# Patient Record
Sex: FEMALE | Race: WHITE | NOT HISPANIC OR LATINO | Employment: FULL TIME | URBAN - METROPOLITAN AREA
[De-identification: names, ages, dates, MRNs, and addresses within clinical notes are randomized per-mention and may not be internally consistent; named-entity substitution may affect disease eponyms.]

---

## 2017-01-18 DIAGNOSIS — Z12.31 ENCOUNTER FOR SCREENING MAMMOGRAM FOR MALIGNANT NEOPLASM OF BREAST: ICD-10-CM

## 2017-01-18 DIAGNOSIS — M54.9 DORSALGIA: ICD-10-CM

## 2017-01-19 ENCOUNTER — ALLSCRIPTS OFFICE VISIT (OUTPATIENT)
Dept: OTHER | Facility: OTHER | Age: 62
End: 2017-01-19

## 2017-01-23 ENCOUNTER — APPOINTMENT (OUTPATIENT)
Dept: PHYSICAL THERAPY | Facility: CLINIC | Age: 62
End: 2017-01-23
Payer: COMMERCIAL

## 2017-01-23 DIAGNOSIS — M54.9 DORSALGIA: ICD-10-CM

## 2017-01-23 PROCEDURE — 97162 PT EVAL MOD COMPLEX 30 MIN: CPT

## 2017-01-23 PROCEDURE — 97140 MANUAL THERAPY 1/> REGIONS: CPT

## 2017-01-24 ENCOUNTER — GENERIC CONVERSION - ENCOUNTER (OUTPATIENT)
Dept: OTHER | Facility: OTHER | Age: 62
End: 2017-01-24

## 2017-01-26 ENCOUNTER — APPOINTMENT (OUTPATIENT)
Dept: PHYSICAL THERAPY | Facility: CLINIC | Age: 62
End: 2017-01-26
Payer: COMMERCIAL

## 2017-01-26 PROCEDURE — 97110 THERAPEUTIC EXERCISES: CPT

## 2017-01-26 PROCEDURE — 97140 MANUAL THERAPY 1/> REGIONS: CPT

## 2017-01-26 PROCEDURE — 97010 HOT OR COLD PACKS THERAPY: CPT

## 2017-01-31 ENCOUNTER — APPOINTMENT (OUTPATIENT)
Dept: PHYSICAL THERAPY | Facility: CLINIC | Age: 62
End: 2017-01-31
Payer: COMMERCIAL

## 2017-01-31 PROCEDURE — 97140 MANUAL THERAPY 1/> REGIONS: CPT

## 2017-01-31 PROCEDURE — 97110 THERAPEUTIC EXERCISES: CPT

## 2017-01-31 PROCEDURE — G0283 ELEC STIM OTHER THAN WOUND: HCPCS

## 2017-01-31 PROCEDURE — 97014 ELECTRIC STIMULATION THERAPY: CPT

## 2017-02-02 ENCOUNTER — APPOINTMENT (OUTPATIENT)
Dept: PHYSICAL THERAPY | Facility: CLINIC | Age: 62
End: 2017-02-02
Payer: COMMERCIAL

## 2017-02-07 ENCOUNTER — APPOINTMENT (OUTPATIENT)
Dept: PHYSICAL THERAPY | Facility: CLINIC | Age: 62
End: 2017-02-07
Payer: COMMERCIAL

## 2017-02-07 PROCEDURE — 97140 MANUAL THERAPY 1/> REGIONS: CPT

## 2017-02-07 PROCEDURE — 97110 THERAPEUTIC EXERCISES: CPT

## 2017-02-07 PROCEDURE — 97014 ELECTRIC STIMULATION THERAPY: CPT

## 2017-02-07 PROCEDURE — G0283 ELEC STIM OTHER THAN WOUND: HCPCS

## 2017-02-09 ENCOUNTER — APPOINTMENT (OUTPATIENT)
Dept: PHYSICAL THERAPY | Facility: CLINIC | Age: 62
End: 2017-02-09
Payer: COMMERCIAL

## 2017-02-09 PROCEDURE — 97140 MANUAL THERAPY 1/> REGIONS: CPT

## 2017-02-09 PROCEDURE — 97110 THERAPEUTIC EXERCISES: CPT

## 2017-02-09 PROCEDURE — 97010 HOT OR COLD PACKS THERAPY: CPT | Performed by: PHYSICAL THERAPIST

## 2017-02-16 ENCOUNTER — APPOINTMENT (OUTPATIENT)
Dept: PHYSICAL THERAPY | Facility: CLINIC | Age: 62
End: 2017-02-16
Payer: COMMERCIAL

## 2017-02-16 PROCEDURE — 97140 MANUAL THERAPY 1/> REGIONS: CPT

## 2017-02-16 PROCEDURE — 97110 THERAPEUTIC EXERCISES: CPT

## 2017-02-21 ENCOUNTER — APPOINTMENT (OUTPATIENT)
Dept: PHYSICAL THERAPY | Facility: CLINIC | Age: 62
End: 2017-02-21
Payer: COMMERCIAL

## 2017-02-21 PROCEDURE — 97110 THERAPEUTIC EXERCISES: CPT

## 2017-02-21 PROCEDURE — 97140 MANUAL THERAPY 1/> REGIONS: CPT

## 2017-04-17 ENCOUNTER — TRANSCRIBE ORDERS (OUTPATIENT)
Dept: ADMINISTRATIVE | Facility: HOSPITAL | Age: 62
End: 2017-04-17

## 2017-04-17 ENCOUNTER — HOSPITAL ENCOUNTER (OUTPATIENT)
Dept: MAMMOGRAPHY | Facility: MEDICAL CENTER | Age: 62
Discharge: HOME | End: 2017-04-17
Payer: COMMERCIAL

## 2017-04-17 DIAGNOSIS — Z12.31 VISIT FOR SCREENING MAMMOGRAM: Primary | ICD-10-CM

## 2017-04-17 DIAGNOSIS — Z12.31 ENCOUNTER FOR SCREENING MAMMOGRAM FOR MALIGNANT NEOPLASM OF BREAST: ICD-10-CM

## 2017-04-17 PROCEDURE — G0202 SCR MAMMO BI INCL CAD: HCPCS

## 2017-05-16 ENCOUNTER — ALLSCRIPTS OFFICE VISIT (OUTPATIENT)
Dept: OTHER | Facility: OTHER | Age: 62
End: 2017-05-16

## 2017-06-05 ENCOUNTER — ANESTHESIA EVENT (OUTPATIENT)
Dept: GASTROENTEROLOGY | Facility: HOSPITAL | Age: 62
End: 2017-06-05
Payer: COMMERCIAL

## 2017-06-05 RX ORDER — FEXOFENADINE HCL 180 MG/1
180 TABLET ORAL DAILY
COMMUNITY

## 2017-06-05 RX ORDER — ESOMEPRAZOLE MAGNESIUM 10 MG/1
10 GRANULE, FOR SUSPENSION, EXTENDED RELEASE ORAL
COMMUNITY
End: 2020-07-09

## 2017-06-05 RX ORDER — GABAPENTIN 300 MG/1
300 CAPSULE ORAL 3 TIMES DAILY
Status: ON HOLD | COMMUNITY
End: 2017-06-06 | Stop reason: ALTCHOICE

## 2017-06-06 ENCOUNTER — HOSPITAL ENCOUNTER (OUTPATIENT)
Facility: HOSPITAL | Age: 62
Setting detail: OUTPATIENT SURGERY
Discharge: HOME | End: 2017-06-06
Attending: SURGERY | Admitting: SURGERY
Payer: COMMERCIAL

## 2017-06-06 ENCOUNTER — GENERIC CONVERSION - ENCOUNTER (OUTPATIENT)
Dept: PERIOP | Facility: HOSPITAL | Age: 62
End: 2017-06-06

## 2017-06-06 ENCOUNTER — ANESTHESIA (OUTPATIENT)
Dept: GASTROENTEROLOGY | Facility: HOSPITAL | Age: 62
End: 2017-06-06
Payer: COMMERCIAL

## 2017-06-06 VITALS
BODY MASS INDEX: 33.13 KG/M2 | WEIGHT: 180 LBS | RESPIRATION RATE: 20 BRPM | HEIGHT: 62 IN | OXYGEN SATURATION: 93 % | HEART RATE: 82 BPM | SYSTOLIC BLOOD PRESSURE: 120 MMHG | DIASTOLIC BLOOD PRESSURE: 75 MMHG | TEMPERATURE: 98.1 F

## 2017-06-06 DIAGNOSIS — Z12.11 ENCOUNTER FOR SCREENING FOR MALIGNANT NEOPLASM OF COLON: ICD-10-CM

## 2017-06-06 PROCEDURE — 88342 IMHCHEM/IMCYTCHM 1ST ANTB: CPT | Performed by: SURGERY

## 2017-06-06 PROCEDURE — 88313 SPECIAL STAINS GROUP 2: CPT | Performed by: SURGERY

## 2017-06-06 PROCEDURE — 88305 TISSUE EXAM BY PATHOLOGIST: CPT | Performed by: SURGERY

## 2017-06-06 RX ORDER — PROPOFOL 10 MG/ML
INJECTION, EMULSION INTRAVENOUS AS NEEDED
Status: DISCONTINUED | OUTPATIENT
Start: 2017-06-06 | End: 2017-06-06 | Stop reason: SURG

## 2017-06-06 RX ORDER — SODIUM CHLORIDE 9 MG/ML
125 INJECTION, SOLUTION INTRAVENOUS CONTINUOUS
Status: DISCONTINUED | OUTPATIENT
Start: 2017-06-06 | End: 2017-06-06 | Stop reason: HOSPADM

## 2017-06-06 RX ORDER — LEVALBUTEROL INHALATION SOLUTION 0.31 MG/3ML
1 SOLUTION RESPIRATORY (INHALATION) EVERY 4 HOURS PRN
COMMUNITY

## 2017-06-06 RX ADMIN — PROPOFOL 50 MG: 10 INJECTION, EMULSION INTRAVENOUS at 08:25

## 2017-06-06 RX ADMIN — PROPOFOL 50 MG: 10 INJECTION, EMULSION INTRAVENOUS at 08:06

## 2017-06-06 RX ADMIN — PROPOFOL 40 MG: 10 INJECTION, EMULSION INTRAVENOUS at 08:15

## 2017-06-06 RX ADMIN — PROPOFOL 70 MG: 10 INJECTION, EMULSION INTRAVENOUS at 08:14

## 2017-06-06 RX ADMIN — PROPOFOL 40 MG: 10 INJECTION, EMULSION INTRAVENOUS at 08:16

## 2017-06-06 RX ADMIN — PROPOFOL 200 MG: 10 INJECTION, EMULSION INTRAVENOUS at 08:01

## 2017-06-06 RX ADMIN — SODIUM CHLORIDE 125 ML/HR: 0.9 INJECTION, SOLUTION INTRAVENOUS at 07:21

## 2017-06-09 ENCOUNTER — GENERIC CONVERSION - ENCOUNTER (OUTPATIENT)
Dept: OTHER | Facility: OTHER | Age: 62
End: 2017-06-09

## 2017-06-12 ENCOUNTER — GENERIC CONVERSION - ENCOUNTER (OUTPATIENT)
Dept: OTHER | Facility: OTHER | Age: 62
End: 2017-06-12

## 2018-01-12 NOTE — MISCELLANEOUS
Message   Recorded as Task   Date: 06/07/2017 09:13 AM, Created By: Yumi Muller   Task Name: Follow Up   Assigned To: [facility],Team   Regarding Patient: Olga Valdez, Status: Active   Musa Roam - 07 Jun 2017 9:13 AM     TASK CREATED  Routine post colonoscopy / EGD call placed to patient  She had these procedures on 06/06/2017  Left message on patient's phone number to please give our office a call back  Pathology is pending  Active Problems    1  Back pain (724 5) (M54 9)   2  Colon cancer screening (V76 51) (Z12 11)   3  Encounter for routine gynecological examination with Papanicolaou smear of cervix   (V72 31,V76 2) (Z01 419)   4  Encounter for screening mammogram for malignant neoplasm of breast (V76 12)   (Z12 31)   5  Laboratory examination ordered as part of a routine general medical examination   (V72 62) (Z00 00)   6  Screening for osteoporosis (V82 81) (Z13 820)   7  Symptomatic menopausal or female climacteric states (627 2) (N95 1)    Current Meds   1  Advair Diskus 250-50 MCG/DOSE Inhalation Aerosol Powder Breath Activated; Therapy: (Recorded:93Kas0824) to Recorded   2  Allegra CAPS; Therapy: (Recorded:77Ass3979) to Recorded   3  MoviPrep 100 GM Oral Solution Reconstituted; USE AS DIRECTED; Therapy: 34ZEI3180 to (Last Rx:04Lxj0661) Ordered   4  NexIUM 10 MG Oral Packet; Therapy: (Recorded:13Ple2951) to Recorded   5  Prempro 0 3-1 5 MG Oral Tablet; Therapy: (Recorded:12Vjy4090) to Recorded    Allergies    1   Sulfa Drugs    Signatures   Electronically signed by : Mily England, ; Jun 9 2017  9:45AM EST                       (Author)

## 2018-01-13 VITALS
HEIGHT: 62 IN | DIASTOLIC BLOOD PRESSURE: 80 MMHG | BODY MASS INDEX: 33.96 KG/M2 | SYSTOLIC BLOOD PRESSURE: 132 MMHG | WEIGHT: 184.56 LBS

## 2018-01-14 VITALS
SYSTOLIC BLOOD PRESSURE: 110 MMHG | HEIGHT: 62 IN | TEMPERATURE: 98.8 F | HEART RATE: 96 BPM | BODY MASS INDEX: 32.96 KG/M2 | DIASTOLIC BLOOD PRESSURE: 76 MMHG | WEIGHT: 179.13 LBS

## 2018-01-15 NOTE — MISCELLANEOUS
Message  Mailed colono & EGD letter to patients home address  Tasked referring - Dr Lianne Pires  Faxed OP notes, path and office visit to Dr Myesha Kaur office  {Updated pre-visit planning to reflect recommended 3 year follow up  }      Active Problems    1  Back pain (724 5) (M54 9)   2  Colon cancer screening (V76 51) (Z12 11)   3  Encounter for routine gynecological examination with Papanicolaou smear of cervix   (V72 31,V76 2) (Z01 419)   4  Encounter for screening mammogram for malignant neoplasm of breast (V76 12)   (Z12 31)   5  Laboratory examination ordered as part of a routine general medical examination   (V72 62) (Z00 00)   6  Screening for osteoporosis (V82 81) (Z13 820)   7  Symptomatic menopausal or female climacteric states (627 2) (N95 1)    Current Meds   1  Advair Diskus 250-50 MCG/DOSE Inhalation Aerosol Powder Breath Activated; Therapy: (Recorded:41Afu2427) to Recorded   2  Allegra CAPS; Therapy: (Recorded:62Ekn8079) to Recorded   3  MoviPrep 100 GM Oral Solution Reconstituted; USE AS DIRECTED; Therapy: 62PMT9401 to (Last Rx:54Ecx7816) Ordered   4  NexIUM 10 MG Oral Packet; Therapy: (Recorded:53Tif2430) to Recorded   5  Prempro 0 3-1 5 MG Oral Tablet; Therapy: (Recorded:83Ikh5255) to Recorded    Allergies    1  Sulfa Drugs    Plan  Colon cancer screening    · COLONOSCOPY (GI, SURG); Status:Complete - Retrospective By Protocol  Authorization;   Done: 55NTR0092 10:14AM   · COLONOSCOPY (GI, SURG) ; every 3 years;  Last 63OQU8313; Next 92GPI1476;  Status:Active    Signatures   Electronically signed by : Vidhya Dewitt, ; Jun 21 2017 10:32AM EST                       (Author)

## 2018-02-15 ENCOUNTER — OFFICE VISIT (OUTPATIENT)
Dept: OBGYN CLINIC | Facility: MEDICAL CENTER | Age: 63
End: 2018-02-15
Payer: COMMERCIAL

## 2018-02-15 VITALS — DIASTOLIC BLOOD PRESSURE: 70 MMHG | BODY MASS INDEX: 33.29 KG/M2 | WEIGHT: 182 LBS | SYSTOLIC BLOOD PRESSURE: 122 MMHG

## 2018-02-15 DIAGNOSIS — Z01.419 GYNECOLOGIC EXAM NORMAL: Primary | ICD-10-CM

## 2018-02-15 DIAGNOSIS — Z12.39 SCREENING FOR MALIGNANT NEOPLASM OF BREAST: ICD-10-CM

## 2018-02-15 DIAGNOSIS — N95.1 SYMPTOMS, SUCH AS FLUSHING, SLEEPLESSNESS, HEADACHE, LACK OF CONCENTRATION, ASSOCIATED WITH THE MENOPAUSE: ICD-10-CM

## 2018-02-15 DIAGNOSIS — Z12.4 ENCOUNTER FOR PAPANICOLAOU SMEAR FOR CERVICAL CANCER SCREENING: ICD-10-CM

## 2018-02-15 PROCEDURE — S0612 ANNUAL GYNECOLOGICAL EXAMINA: HCPCS | Performed by: OBSTETRICS & GYNECOLOGY

## 2018-02-15 PROCEDURE — G0145 SCR C/V CYTO,THINLAYER,RESCR: HCPCS | Performed by: OBSTETRICS & GYNECOLOGY

## 2018-02-15 NOTE — PROGRESS NOTES
ASSESSMENT & PLAN: Yeison Adamson is a 63 y o  [place] with normal gynecologic exam     1   Routine well woman exam done today  2  Pap and HPV:  The patient's Pap and cotesting was done today  Current ASCCP Guidelines reviewed  Per patient's request  3  Mammogram ordered - due 2018  4  Colonoscopy last done 17  4  The following were reviewed in today's visit: breast self exam  5  Restarted PremPro - would like refill  CC:  Annual Gynecologic Examination    HPI: Yeison Adamson is a 63 y o  [place] who presents for annual gynecologic examination  She has the following concerns:  None  Tried Gabapentin last year but did not tolerate side effects    Health Maintenance:    She wears her seatbelt routinely  She does perform regular monthly self breast exams  She feels safe at home  Pap:   Last mammogram:   Last colonoscopy:     Past Medical History:   Diagnosis Date    Asthma     Chronic pain disorder     back    GERD (gastroesophageal reflux disease)     Hearing loss of right ear     Obesity     Sleep apnea     wears cpap       Past Surgical History:   Procedure Laterality Date    BLADDER SURGERY      sling     SECTION      EGD AND COLONOSCOPY N/A 2017    Procedure: EGD with bx AND COLONOSCOPY polypectomy x3;  Surgeon: Mildred Canales MD;  Location: [facility]; Service:        Past OB/Gyn History:  OB History      Para Term  AB Living    5 2            SAB TAB Ectopic Multiple Live Births                         History of sexually transmitted infection No  History of abnormal pap smears  No     History reviewed  No pertinent family history  Social History:  Social History     Social History    Marital status: /Civil Union     Spouse name: N/A    Number of children: N/A    Years of education: N/A     Occupational History    Not on file       Social History Main Topics    Smoking status: Never Smoker    Smokeless tobacco: Never Used   [place] Alcohol use No    Drug use: No    Sexual activity: No     Other Topics Concern    Not on file     Social History Narrative    No narrative on file       Allergies   Allergen Reactions    Eggs Or Egg-Derived Products GI Intolerance    Fruit C [Ascorbate]      strawberries  strawberries    Omeprazole      Other reaction(s): nausea    Acetazolamide Rash    Sulfa Antibiotics Rash       Current Outpatient Prescriptions:     esomeprazole (NexIUM) 10 MG packet, Take 10 mg by mouth every morning before breakfast, Disp: , Rfl:     estrogen, conjugated,-medroxyprogesterone (PREMPRO) 0 3-1 5 MG per tablet, Take 1 tablet by mouth daily, Disp: , Rfl:     fexofenadine (ALLEGRA) 180 MG tablet, Take 180 mg by mouth daily, Disp: , Rfl:     fluticasone-salmeterol (ADVAIR) 100-50 mcg/dose, Inhale 1 puff every 12 (twelve) hours, Disp: , Rfl:     levalbuterol (XOPENEX) 0 31 mg/3 mL nebulizer solution, Take 1 ampule by nebulization every 4 (four) hours as needed for wheezing, Disp: , Rfl:       Review of Systems  Constitutional :no fever, feels well, no tiredness, no recent weight gain or loss  ENT: no ear ache, no loss of hearing, no nosebleeds or nasal discharge, no sore throat or hoarseness  Cardiovascular: no complaints of slow or fast heart beat, no chest pain, no palpitations, no leg claudication or lower extremity edema  Respiratory: no complaints of shortness of shortness of breath, no REGALADO  Breasts:no complaints of breast pain, breast lump, or nipple discharge  Gastrointestinal: no complaints of abdominal pain, constipation,nausea, vomiting, or diarrhea or bloody stools  Genitourinary : no complaints of dysuria, incontinence, pelvic pain, no dysmenorrhea, vaginal discharge or abnormal vaginal bleeding and as noted in HPI    Integumentary: no complaints of skin rash or lesion, itching or dry skin  Neurological: no complaints of headache, no confusion, no numbness or tingling, no dizziness or fainting    Objective /70   Wt 82 6 kg (182 lb)   BMI 33 29 kg/m²     General:   appears stated age, cooperative, alert normal mood and affect   Neck: Neck: normal, supple,trachea midline, no masses   Heart: regular rate and rhythm, S1, S2 normal, no murmur, click, rub or gallop   Lungs: clear to auscultation bilaterally   Breasts: normal appearance, no masses or tenderness, No nipple retraction or dimpling, No nipple discharge or bleeding   Abdomen: soft, non-tender, without masses or organomegaly   Vulva: normal female genitalia, Bartholin's, Urethra, [place] normal, no lesions   Vagina: normal vagina, no discharge, exudate, lesion, or erythema   Urethra: normal   Cervix: Normal, no discharge  PAP done     Uterus: normal size, contour, position, consistency, mobility, non-tender   Adnexa: normal adnexa

## 2018-02-19 LAB
LAB AP GYN PRIMARY INTERPRETATION: NORMAL
Lab: NORMAL

## 2018-04-18 ENCOUNTER — HOSPITAL ENCOUNTER (OUTPATIENT)
Dept: MAMMOGRAPHY | Facility: MEDICAL CENTER | Age: 63
Discharge: HOME | End: 2018-04-18
Payer: COMMERCIAL

## 2018-04-18 DIAGNOSIS — Z12.39 SCREENING FOR MALIGNANT NEOPLASM OF BREAST: ICD-10-CM

## 2018-04-18 PROCEDURE — 77067 SCR MAMMO BI INCL CAD: CPT

## 2018-04-18 PROCEDURE — 77063 BREAST TOMOSYNTHESIS BI: CPT

## 2020-01-15 ENCOUNTER — TELEPHONE (OUTPATIENT)
Dept: OBGYN CLINIC | Facility: MEDICAL CENTER | Age: 65
End: 2020-01-15

## 2020-01-15 DIAGNOSIS — Z12.39 SCREENING FOR BREAST CANCER: Primary | ICD-10-CM

## 2020-02-26 ENCOUNTER — ANNUAL EXAM (OUTPATIENT)
Dept: OBGYN CLINIC | Facility: MEDICAL CENTER | Age: 65
End: 2020-02-26
Payer: COMMERCIAL

## 2020-02-26 VITALS — DIASTOLIC BLOOD PRESSURE: 70 MMHG | SYSTOLIC BLOOD PRESSURE: 126 MMHG | WEIGHT: 185.4 LBS | BODY MASS INDEX: 33.91 KG/M2

## 2020-02-26 DIAGNOSIS — N95.1 VASOMOTOR SYMPTOMS DUE TO MENOPAUSE: ICD-10-CM

## 2020-02-26 DIAGNOSIS — Z01.419 ENCOUNTER FOR GYNECOLOGICAL EXAMINATION WITH PAPANICOLAOU SMEAR OF CERVIX: ICD-10-CM

## 2020-02-26 DIAGNOSIS — Z01.419 ENCOUNTER FOR WELL WOMAN EXAM WITH ROUTINE GYNECOLOGICAL EXAM: Primary | ICD-10-CM

## 2020-02-26 PROCEDURE — G0145 SCR C/V CYTO,THINLAYER,RESCR: HCPCS | Performed by: OBSTETRICS & GYNECOLOGY

## 2020-02-26 PROCEDURE — S0612 ANNUAL GYNECOLOGICAL EXAMINA: HCPCS | Performed by: OBSTETRICS & GYNECOLOGY

## 2020-02-26 RX ORDER — PAROXETINE 7.5 MG/1
7.5 CAPSULE ORAL DAILY
Qty: 30 CAPSULE | Refills: 3 | Status: SHIPPED | OUTPATIENT
Start: 2020-02-26 | End: 2020-08-14

## 2020-02-26 NOTE — PROGRESS NOTES
ASSESSMENT & PLAN: Eduarda Mann is a 60 y o   with normal gynecologic exam     1   Routine well woman exam done today  2  Pap and HPV:  The patient's Pap with reflex cotesting was done today  Current ASCCP Guidelines reviewed  Per patient's request  3  Mammogram ordered - done 2018  4  Colonoscopy last done 17  4  The following were reviewed in today's visit: breast self exam  5  Vasomotor symptoms:  Stopped prempro 6 months ago, willing to try [place]    CC:  Annual Gynecologic Examination    HPI: Eduarda Mann is a 60 y o  [place] who presents for annual gynecologic examination  She has the following concerns:  None  Tried Gabapentin last year but did not tolerate side effects    Health Maintenance:    She wears her seatbelt routinely  She does perform regular monthly self breast exams  She feels safe at home  Pap:   Last mammogram:   Last colonoscopy:     Past Medical History:   Diagnosis Date    Asthma     Chronic pain disorder     back    GERD (gastroesophageal reflux disease)     Hearing loss of right ear     Obesity     Sleep apnea     wears cpap       Past Surgical History:   Procedure Laterality Date    BLADDER SURGERY      sling     SECTION      EGD AND COLONOSCOPY N/A 2017    Procedure: EGD with bx AND COLONOSCOPY polypectomy x3;  Surgeon: Meredith Salazar MD;  Location: [facility]; Service:        Past OB/Gyn History:  OB History        5    Para   3    Term   2       1    AB   2    Living   2       SAB   2    TAB        Ectopic        Multiple        Live Births   2                   History of sexually transmitted infection No  History of abnormal pap smears  No     History reviewed  No pertinent family history      Social History:  Social History     Socioeconomic History    Marital status: /Civil Union     Spouse name: Not on file    Number of children: Not on file    Years of education: Not on file    Highest education level: Not on file   Occupational History    Not on file   Social Needs    Financial resource strain: Not on file    Food insecurity:     Worry: Not on file     Inability: Not on file    Transportation needs:     Medical: Not on file     Non-medical: Not on file   Tobacco Use    Smoking status: Never Smoker    Smokeless tobacco: Never Used   Substance and Sexual Activity    Alcohol use: No    Drug use: No    Sexual activity: Not Currently     Birth control/protection: Post-menopausal   Lifestyle    Physical activity:     Days per week: Not on file     Minutes per session: Not on file    Stress: Not on file   Relationships    Social connections:     Talks on phone: Not on file     Gets together: Not on file     Attends Buddhism service: Not on file     Active member of club or organization: Not on file     Attends meetings of clubs or organizations: Not on file     Relationship status: Not on file    Intimate partner violence:     Fear of current or ex partner: Not on file     Emotionally abused: Not on file     Physically abused: Not on file     Forced sexual activity: Not on file   Other Topics Concern    Not on file   Social History Narrative    Not on file       Allergies   Allergen Reactions    Eggs Or Egg-Derived Products GI Intolerance    Fruit C [Ascorbate]      strawberries  strawberries    Omeprazole      Other reaction(s): nausea    Acetazolamide Rash    Sulfa Antibiotics Rash       Current Outpatient Medications:     esomeprazole (NexIUM) 10 MG packet, Take 10 mg by mouth every morning before breakfast, Disp: , Rfl:     fexofenadine (ALLEGRA) 180 MG tablet, Take 180 mg by mouth daily, Disp: , Rfl:     fluticasone-salmeterol (ADVAIR) 100-50 mcg/dose, Inhale 1 puff every 12 (twelve) hours, Disp: , Rfl:     levalbuterol (XOPENEX) 0 31 mg/3 mL nebulizer solution, Take 1 ampule by nebulization every 4 (four) hours as needed for wheezing, Disp: , Rfl:     estrogen, conjugated,-medroxyprogesterone (PREMPRO) 0 3-1 5 MG per tablet, Take 1 tablet by mouth daily (Patient not taking: Reported on 2/26/2020), Disp: 90 tablet, Rfl: 3      Review of Systems  Constitutional :no fever, feels well, no tiredness, no recent weight gain or loss  ENT: no ear ache, no loss of hearing, no nosebleeds or nasal discharge, no sore throat or hoarseness  Cardiovascular: no complaints of slow or fast heart beat, no chest pain, no palpitations, no leg claudication or lower extremity edema  Respiratory: no complaints of shortness of shortness of breath, no REGALADO  Breasts:no complaints of breast pain, breast lump, or nipple discharge  Gastrointestinal: no complaints of abdominal pain, constipation,nausea, vomiting, or diarrhea or bloody stools  Genitourinary : no complaints of dysuria, incontinence, pelvic pain, no dysmenorrhea, vaginal discharge or abnormal vaginal bleeding and as noted in HPI  Integumentary: no complaints of skin rash or lesion, itching or dry skin  Neurological: no complaints of headache, no confusion, no numbness or tingling, no dizziness or fainting    Objective      /70   Wt 84 1 kg (185 lb 6 4 oz)   BMI 33 91 kg/m²     General:   appears stated age, cooperative, alert normal mood and affect   Neck: Neck: normal, supple,trachea midline, no masses   Heart: regular rate and rhythm, S1, S2 normal, no murmur, click, rub or gallop   Lungs: clear to auscultation bilaterally   Breasts: normal appearance, no masses or tenderness, No nipple retraction or dimpling, No nipple discharge or bleeding   Abdomen: soft, non-tender, without masses or organomegaly   Vulva: normal female genitalia, Bartholin's, Urethra, [place] normal, no lesions   Vagina: normal vagina, no discharge, exudate, lesion, or erythema   Urethra: normal   Cervix: Normal, no discharge  PAP done     Uterus: normal size, contour, position, consistency, mobility, non-tender   Adnexa: normal adnexa

## 2020-03-02 LAB
LAB AP GYN PRIMARY INTERPRETATION: NORMAL
Lab: NORMAL

## 2020-03-20 ENCOUNTER — HOSPITAL ENCOUNTER (OUTPATIENT)
Dept: MAMMOGRAPHY | Facility: MEDICAL CENTER | Age: 65
Discharge: HOME | End: 2020-03-20
Payer: COMMERCIAL

## 2020-03-20 VITALS — HEIGHT: 62 IN | WEIGHT: 185 LBS | BODY MASS INDEX: 34.04 KG/M2

## 2020-03-20 DIAGNOSIS — Z12.39 SCREENING FOR BREAST CANCER: ICD-10-CM

## 2020-03-20 PROCEDURE — 77067 SCR MAMMO BI INCL CAD: CPT

## 2020-03-20 PROCEDURE — 77063 BREAST TOMOSYNTHESIS BI: CPT

## 2020-06-01 ENCOUNTER — EVALUATION (OUTPATIENT)
Dept: PHYSICAL THERAPY | Facility: MEDICAL CENTER | Age: 65
End: 2020-06-01
Payer: COMMERCIAL

## 2020-06-01 DIAGNOSIS — Z47.89 AFTERCARE FOLLOWING SURGERY OF THE MUSCULOSKELETAL SYSTEM, NEC: ICD-10-CM

## 2020-06-01 DIAGNOSIS — M18.11 ARTHRITIS OF CARPOMETACARPAL (CMC) JOINT OF RIGHT THUMB: Primary | ICD-10-CM

## 2020-06-01 PROCEDURE — 97140 MANUAL THERAPY 1/> REGIONS: CPT

## 2020-06-01 PROCEDURE — L3913 HFO W/O JOINTS CF: HCPCS

## 2020-06-01 PROCEDURE — 97161 PT EVAL LOW COMPLEX 20 MIN: CPT

## 2020-06-01 RX ORDER — HYDROCODONE BITARTRATE AND ACETAMINOPHEN 5; 325 MG/1; MG/1
1 TABLET ORAL EVERY 6 HOURS PRN
COMMUNITY
End: 2020-08-14

## 2020-06-04 ENCOUNTER — OFFICE VISIT (OUTPATIENT)
Dept: PHYSICAL THERAPY | Facility: MEDICAL CENTER | Age: 65
End: 2020-06-04
Payer: COMMERCIAL

## 2020-06-04 DIAGNOSIS — Z47.89 AFTERCARE FOLLOWING SURGERY OF THE MUSCULOSKELETAL SYSTEM, NEC: ICD-10-CM

## 2020-06-04 DIAGNOSIS — M18.11 ARTHRITIS OF CARPOMETACARPAL (CMC) JOINT OF RIGHT THUMB: Primary | ICD-10-CM

## 2020-06-04 PROCEDURE — 97110 THERAPEUTIC EXERCISES: CPT

## 2020-06-04 PROCEDURE — 97140 MANUAL THERAPY 1/> REGIONS: CPT

## 2020-06-04 PROCEDURE — 97010 HOT OR COLD PACKS THERAPY: CPT

## 2020-06-08 ENCOUNTER — APPOINTMENT (OUTPATIENT)
Dept: PHYSICAL THERAPY | Facility: MEDICAL CENTER | Age: 65
End: 2020-06-08
Payer: COMMERCIAL

## 2020-06-10 ENCOUNTER — APPOINTMENT (OUTPATIENT)
Dept: PHYSICAL THERAPY | Facility: MEDICAL CENTER | Age: 65
End: 2020-06-10
Payer: COMMERCIAL

## 2020-06-12 ENCOUNTER — OFFICE VISIT (OUTPATIENT)
Dept: PHYSICAL THERAPY | Facility: MEDICAL CENTER | Age: 65
End: 2020-06-12
Payer: COMMERCIAL

## 2020-06-12 DIAGNOSIS — M18.11 ARTHRITIS OF CARPOMETACARPAL (CMC) JOINT OF RIGHT THUMB: Primary | ICD-10-CM

## 2020-06-12 DIAGNOSIS — Z47.89 AFTERCARE FOLLOWING SURGERY OF THE MUSCULOSKELETAL SYSTEM, NEC: ICD-10-CM

## 2020-06-12 PROCEDURE — 97110 THERAPEUTIC EXERCISES: CPT

## 2020-06-12 PROCEDURE — 97140 MANUAL THERAPY 1/> REGIONS: CPT

## 2020-06-12 PROCEDURE — 97010 HOT OR COLD PACKS THERAPY: CPT

## 2020-06-15 ENCOUNTER — OFFICE VISIT (OUTPATIENT)
Dept: PHYSICAL THERAPY | Facility: MEDICAL CENTER | Age: 65
End: 2020-06-15
Payer: COMMERCIAL

## 2020-06-15 DIAGNOSIS — M18.11 ARTHRITIS OF CARPOMETACARPAL (CMC) JOINT OF RIGHT THUMB: Primary | ICD-10-CM

## 2020-06-15 DIAGNOSIS — Z47.89 AFTERCARE FOLLOWING SURGERY OF THE MUSCULOSKELETAL SYSTEM, NEC: ICD-10-CM

## 2020-06-15 PROCEDURE — 97010 HOT OR COLD PACKS THERAPY: CPT

## 2020-06-15 PROCEDURE — 97140 MANUAL THERAPY 1/> REGIONS: CPT

## 2020-06-15 PROCEDURE — 97110 THERAPEUTIC EXERCISES: CPT

## 2020-06-17 ENCOUNTER — OFFICE VISIT (OUTPATIENT)
Dept: PHYSICAL THERAPY | Facility: MEDICAL CENTER | Age: 65
End: 2020-06-17
Payer: COMMERCIAL

## 2020-06-17 DIAGNOSIS — Z47.89 AFTERCARE FOLLOWING SURGERY OF THE MUSCULOSKELETAL SYSTEM, NEC: ICD-10-CM

## 2020-06-17 DIAGNOSIS — M18.11 ARTHRITIS OF CARPOMETACARPAL (CMC) JOINT OF RIGHT THUMB: Primary | ICD-10-CM

## 2020-06-17 PROCEDURE — 97140 MANUAL THERAPY 1/> REGIONS: CPT

## 2020-06-17 PROCEDURE — 97010 HOT OR COLD PACKS THERAPY: CPT

## 2020-06-17 PROCEDURE — 97110 THERAPEUTIC EXERCISES: CPT

## 2020-06-22 ENCOUNTER — OFFICE VISIT (OUTPATIENT)
Dept: PHYSICAL THERAPY | Facility: MEDICAL CENTER | Age: 65
End: 2020-06-22
Payer: COMMERCIAL

## 2020-06-22 DIAGNOSIS — M18.11 ARTHRITIS OF CARPOMETACARPAL (CMC) JOINT OF RIGHT THUMB: Primary | ICD-10-CM

## 2020-06-22 DIAGNOSIS — Z47.89 AFTERCARE FOLLOWING SURGERY OF THE MUSCULOSKELETAL SYSTEM, NEC: ICD-10-CM

## 2020-06-22 PROCEDURE — 97140 MANUAL THERAPY 1/> REGIONS: CPT

## 2020-06-22 PROCEDURE — 97010 HOT OR COLD PACKS THERAPY: CPT

## 2020-06-22 PROCEDURE — 97110 THERAPEUTIC EXERCISES: CPT

## 2020-06-24 ENCOUNTER — OFFICE VISIT (OUTPATIENT)
Dept: PHYSICAL THERAPY | Facility: MEDICAL CENTER | Age: 65
End: 2020-06-24
Payer: COMMERCIAL

## 2020-06-24 DIAGNOSIS — Z47.89 AFTERCARE FOLLOWING SURGERY OF THE MUSCULOSKELETAL SYSTEM, NEC: ICD-10-CM

## 2020-06-24 DIAGNOSIS — M18.11 ARTHRITIS OF CARPOMETACARPAL (CMC) JOINT OF RIGHT THUMB: Primary | ICD-10-CM

## 2020-06-24 PROCEDURE — 97140 MANUAL THERAPY 1/> REGIONS: CPT

## 2020-06-24 PROCEDURE — 97010 HOT OR COLD PACKS THERAPY: CPT

## 2020-06-24 PROCEDURE — 97110 THERAPEUTIC EXERCISES: CPT

## 2020-06-29 ENCOUNTER — APPOINTMENT (OUTPATIENT)
Dept: PHYSICAL THERAPY | Facility: MEDICAL CENTER | Age: 65
End: 2020-06-29
Payer: COMMERCIAL

## 2020-07-01 ENCOUNTER — OFFICE VISIT (OUTPATIENT)
Dept: PHYSICAL THERAPY | Facility: MEDICAL CENTER | Age: 65
End: 2020-07-01
Payer: COMMERCIAL

## 2020-07-01 DIAGNOSIS — M18.11 ARTHRITIS OF CARPOMETACARPAL (CMC) JOINT OF RIGHT THUMB: Primary | ICD-10-CM

## 2020-07-01 DIAGNOSIS — Z47.89 AFTERCARE FOLLOWING SURGERY OF THE MUSCULOSKELETAL SYSTEM, NEC: ICD-10-CM

## 2020-07-01 PROCEDURE — 97140 MANUAL THERAPY 1/> REGIONS: CPT

## 2020-07-01 PROCEDURE — 97010 HOT OR COLD PACKS THERAPY: CPT

## 2020-07-01 NOTE — PROGRESS NOTES
Daily Note     Today's date: 2020  Patient name: Yeison Adamson  : 1955  MRN: [id number]  Referring provider: Wanda Monroe MD  Dx:   Encounter Diagnosis     ICD-10-CM    1  Arthritis of carpometacarpal (CMC) joint of right thumb M18 11    2  Aftercare following surgery of the musculoskeletal system, NEC Z47 89                   Subjective: Pt reports she still has numbness and tingling in her thumb  Also has shooting pain that is excruciating  Has tried the desensitization exercises but hasn't found lasting relief with them  Hasn't been using the thumb for hardly anything  Pt RTD       Objective: See treatment diary below      Assessment: Tolerated treatment well  Patient exhibited good technique with therapeutic exercises and would benefit from continued PT pt had 0 07g west monofilament all digits when assessed  No ROM limitations  Pt able to complete exercises without significant pain  Plan: Continue per plan of care        Precautions: DOS 2020  RTD 20      Manuals 6/4 6/12 6/15 6/17 6/22 6/24 7/1      wound mgmt/scar massage 5 5 5 5 5 5 5      AAROM thumb/wrist 15 10 15 10 10 10 10                    20 15 20 15 15 15 15      Neuro Re-Ed                                                                                                        Ther Ex             Towel bunches 2 min 2 min 2 min 2 min 2 min 2 min 2 min      Cones grasping 3 min pegs Pegs 3 min Pegs 3 min Pegs 3 min Pegs 3 min Pegs 3 min      Wrist AROM 20x 2x15 2x10 2x10 2x10 2x10 2x10      Ball roll for wrist  2 min 2 min 2 min 2 min 2 min 2 min      Grooved pegs    1 board 1 board 1 board 1 board                                 10 15 10 15 15 15 15      Ther Activity                                       Gait Training                                       Modalities             MH 10 min 10 min 10 min 10 min 10 10 10      CP 10 min 10 min 10  10 10 10

## 2020-07-06 ENCOUNTER — OFFICE VISIT (OUTPATIENT)
Dept: PHYSICAL THERAPY | Facility: MEDICAL CENTER | Age: 65
End: 2020-07-06
Payer: COMMERCIAL

## 2020-07-06 DIAGNOSIS — Z47.89 AFTERCARE FOLLOWING SURGERY OF THE MUSCULOSKELETAL SYSTEM, NEC: ICD-10-CM

## 2020-07-06 DIAGNOSIS — M18.11 ARTHRITIS OF CARPOMETACARPAL (CMC) JOINT OF RIGHT THUMB: Primary | ICD-10-CM

## 2020-07-06 PROCEDURE — 97010 HOT OR COLD PACKS THERAPY: CPT

## 2020-07-06 PROCEDURE — 97110 THERAPEUTIC EXERCISES: CPT

## 2020-07-06 PROCEDURE — 97140 MANUAL THERAPY 1/> REGIONS: CPT

## 2020-07-06 NOTE — LETTER
2020    Carlos Jaramillo, [facility] [address]  [place] [address]    Patient: Yeison Adamson   YOB: 1955   Date of Visit: 2020     Encounter Diagnosis     ICD-10-CM    1  Arthritis of carpometacarpal (CMC) joint of right thumb M18 11    2  Aftercare following surgery of the musculoskeletal system, NEC Z55 80        Dear Dr Quarles Eugene:    Thank you for your recent referral of Yeison Adamson  Please review the attached evaluation summary from Dayna's recent visit  Please verify that you agree with the plan of care by signing the attached order  If you have any questions or concerns, please do not hesitate to call  I sincerely appreciate the opportunity to share in the care of one of your patients and hope to have another opportunity to work with you in the near future  Sincerely,    Michelle Stratton, PT      Referring Provider:      I certify that I have read the below Plan of Care and certify the need for these services furnished under this plan of treatment while under my care  Carlos Jaramillo MD  [address]: [phone]          PT Re-Evaluation     Today's date: 2020  Patient name: Yeison Adamson  : 1955  MRN: [id number]  Referring provider: Wanda Monroe MD  Dx:   Encounter Diagnosis     ICD-10-CM    1  Arthritis of carpometacarpal (CMC) joint of right thumb M18 11    2  Aftercare following surgery of the musculoskeletal system, NEC Z47 89                   Assessment  Assessment details: Pt has been progressing well with gaining back her motion however she reports she still has significant pain with active motion of her wrist and thumb  She also reports sensitivity along her incision and intermittent nerve pain  We have been working on her active motion and her fine motor skills, she is performing desensitization activities at home   Pt also reports continued numbness in pulp of her invovled thumb yet her sensation is intact upon assessment  We would like to initiate light strengthening as long as her pain is well controlled  Recommend another 4 weeks of therapy to better her strength and functioning of her R thumb  Thank you  Impairments: abnormal or restricted ROM, activity intolerance, impaired physical strength and pain with function  Understanding of Dx/Px/POC: good   Prognosis: good    Goals  STG (2-3 weeks)  1: reduce pain 2 grades on VAS- not met  2: Promote scar healing once sutures removed- met  3: Pt independent in HEP- met  4: Improve ROM 5-10 degrees-met    LTG (6-8 weeks)  1: Improve functional measure > 50/100  2: Th AROM opp to base of RF or better- met  3: Full AROM wrist- partially met  4: pt able to grasp and hold light objects without limitation- partially met  5: pt able to tolerate light strengthening- not met    Plan  Plan details: Initiate PT as per POC/protocol  Please fax your protocol at your convenience  Thank you      Patient would benefit from: skilled physical therapy  Planned modality interventions: thermotherapy: hydrocollator packs and cryotherapy  Planned therapy interventions: joint mobilization, manual therapy, massage, neuromuscular re-education, patient education, orthotic fitting/training, home exercise program, flexibility, functional ROM exercises, fine motor coordination training, therapeutic activities, therapeutic exercise and stretching  Frequency: 2x week  Duration in visits: 16  Duration in weeks: 8  Plan of Care beginning date: 6/1/2020  Plan of Care expiration date: 7/27/2020  Treatment plan discussed with: patient        Subjective Evaluation    History of Present Illness  Date of surgery: 5/28/2020  Mechanism of injury: surgery  Mechanism of injury: Pt reports she still has significant pain in her wrist and thumb with light activities  Has been taking ibuprofen at night still   Not using thumb for many of her activities  Still has numbness to pulp of her thumb  Sensitivity along scar- performing desensitization activities at home; unable to tolerate thumb spica                  Recurrent probem    Quality of life: good    Pain  Current pain ratin  At best pain ratin  At worst pain rating: 10  Location: radial wrist and thumb  Quality: throbbing, radiating and tight  Relieving factors: medications, ice and rest  Progression: no change    Social Support    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[employer])  Hand dominance: right    Patient Goals  Patient goals for therapy: decreased edema, decreased pain, increased motion, increased strength, independence with ADLs/IADLs and return to work          Objective     Observations     Right Wrist/Hand   Positive for incision  Additional Observation Details  Incision closed, healed well  No edema    Neurological Testing     Sensation     Wrist/Hand     Comments   Right light touch: 0 07 g all digits    Active Range of Motion     Left Wrist   Wrist flexion: 70 degrees   Wrist extension: 60 degrees   Radial deviation: 20 degrees   Ulnar deviation: 40 degrees     Right Wrist   Wrist flexion: 65 degrees   Wrist extension: 50 degrees   Radial deviation: 25 degrees   Ulnar deviation: 40 degrees     Right Thumb   Flexion     MP: 45    DIP: 50  Extension     CMC: 50  Palmar Abduction    CMC: 55  Opposition:  Th opp to P1 of SF    Additional Active Range of Motion Details  Full comp fist    Passive Range of Motion     Right Thumb   Flexion     MP: 55    DIP: 55             Precautions: DOS 2020    RTD 20      Manuals 6/4 6/12 6/15 6/17 6/22 6/24 7/1 7/6     wound mgmt/scar massage 5 5 5 5 5 5 5 5     AAROM thumb/wrist 15 10 15 10 10 10 10 10                   20 15 20 15 15 15 15 15     Neuro Re-Ed                                                                                                        Ther Ex             Towel bunches 2 min 2 min 2 min 2 min 2 min 2 min 2 min 2 min     Cones grasping 3 min pegs Pegs 3 min Pegs 3 min Pegs 3 min Pegs 3 min Pegs 3 min  pegs 3 min     Wrist AROM 20x 2x15 2x10 2x10 2x10 2x10 2x10  2x10     Ball roll for wrist  2 min 2 min 2 min 2 min 2 min 2 min 2 min     Grooved pegs    1 board 1 board 1 board 1 board  1board                                10 15 10 15 15 15 15 15     Ther Activity                                       Gait Training                                       Modalities             MH 10 min 10 min 10 min 10 min 10 10 10 10     CP 10 min 10 min 10  10 10 10 10

## 2020-07-06 NOTE — PROGRESS NOTES
PT Re-Evaluation     Today's date: 2020  Patient name: Darcy Giang  : 1955  MRN: [id number]  Referring provider: Tony Su MD  Dx:   Encounter Diagnosis     ICD-10-CM    1  Arthritis of carpometacarpal (CMC) joint of right thumb M18 11    2  Aftercare following surgery of the musculoskeletal system, NEC Z47 89                   Assessment  Assessment details: Pt has been progressing well with gaining back her motion however she reports she still has significant pain with active motion of her wrist and thumb  She also reports sensitivity along her incision and intermittent nerve pain  We have been working on her active motion and her fine motor skills, she is performing desensitization activities at home  Pt also reports continued numbness in pulp of her invovled thumb yet her sensation is intact upon assessment  We would like to initiate light strengthening as long as her pain is well controlled  Recommend another 4 weeks of therapy to better her strength and functioning of her R thumb  Thank you  Impairments: abnormal or restricted ROM, activity intolerance, impaired physical strength and pain with function  Understanding of Dx/Px/POC: good   Prognosis: good    Goals  STG (2-3 weeks)  1: reduce pain 2 grades on VAS- not met  2: Promote scar healing once sutures removed- met  3: Pt independent in HEP- met  4: Improve ROM 5-10 degrees-met    LTG (6-8 weeks)  1: Improve functional measure > 50/100  2: Th AROM opp to base of RF or better- met  3: Full AROM wrist- partially met  4: pt able to grasp and hold light objects without limitation- partially met  5: pt able to tolerate light strengthening- not met    Plan  Plan details: Initiate PT as per POC/protocol  Please fax your protocol at your convenience  Thank you      Patient would benefit from: skilled physical therapy  Planned modality interventions: thermotherapy: hydrocollator packs and cryotherapy  Planned therapy interventions: joint mobilization, manual therapy, massage, neuromuscular re-education, patient education, orthotic fitting/training, home exercise program, flexibility, functional ROM exercises, fine motor coordination training, therapeutic activities, therapeutic exercise and stretching  Frequency: 2x week  Duration in visits: 16  Duration in weeks: 8  Plan of Care beginning date: 2020  Plan of Care expiration date: 2020  Treatment plan discussed with: patient        Subjective Evaluation    History of Present Illness  Date of surgery: 2020  Mechanism of injury: surgery  Mechanism of injury: Pt reports she still has significant pain in her wrist and thumb with light activities  Has been taking ibuprofen at night still   Not using thumb for many of her activities  Still has numbness to pulp of her thumb  Sensitivity along scar- performing desensitization activities at home; unable to tolerate thumb spica                  Recurrent probem    Quality of life: good    Pain  Current pain ratin  At best pain ratin  At worst pain rating: 10  Location: radial wrist and thumb  Quality: throbbing, radiating and tight  Relieving factors: medications, ice and rest  Progression: no change    Social Support    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[employer])  Hand dominance: right    Patient Goals  Patient goals for therapy: decreased edema, decreased pain, increased motion, increased strength, independence with ADLs/IADLs and return to work          Objective     Observations     Right Wrist/Hand   Positive for incision       Additional Observation Details  Incision closed, healed well  No edema    Neurological Testing     Sensation     Wrist/Hand     Comments   Right light touch: 0 07 g all digits    Active Range of Motion     Left Wrist   Wrist flexion: 70 degrees   Wrist extension: 60 degrees   Radial deviation: 20 degrees   Ulnar deviation: 40 degrees     Right Wrist   Wrist flexion: 65 degrees   Wrist extension: 50 degrees   Radial deviation: 25 degrees   Ulnar deviation: 40 degrees     Right Thumb   Flexion     MP: 45    DIP: 50  Extension     CMC: 50  Palmar Abduction    CMC: 55  Opposition:  Th opp to P1 of SF    Additional Active Range of Motion Details  Full comp fist    Passive Range of Motion     Right Thumb   Flexion     MP: 55    DIP: 55             Precautions: DOS 5/27/2020    RTD 7/7/20      Manuals 6/4 6/12 6/15 6/17 6/22 6/24 7/1 7/6     wound mgmt/scar massage 5 5 5 5 5 5 5 5     AAROM thumb/wrist 15 10 15 10 10 10 10 10                   20 15 20 15 15 15 15 15     Neuro Re-Ed                                                                                                        Ther Ex             Towel bunches 2 min 2 min 2 min 2 min 2 min 2 min 2 min 2 min     Cones grasping 3 min pegs Pegs 3 min Pegs 3 min Pegs 3 min Pegs 3 min Pegs 3 min  pegs 3 min     Wrist AROM 20x 2x15 2x10 2x10 2x10 2x10 2x10  2x10     Ball roll for wrist  2 min 2 min 2 min 2 min 2 min 2 min 2 min     Grooved pegs    1 board 1 board 1 board 1 board  1board                                10 15 10 15 15 15 15 15     Ther Activity                                       Gait Training                                       Modalities             MH 10 min 10 min 10 min 10 min 10 10 10 10     CP 10 min 10 min 10  10 10 10 10

## 2020-07-09 ENCOUNTER — OFFICE VISIT (OUTPATIENT)
Dept: GASTROENTEROLOGY | Facility: MEDICAL CENTER | Age: 65
End: 2020-07-09
Payer: COMMERCIAL

## 2020-07-09 VITALS
BODY MASS INDEX: 32.92 KG/M2 | DIASTOLIC BLOOD PRESSURE: 74 MMHG | WEIGHT: 180 LBS | SYSTOLIC BLOOD PRESSURE: 126 MMHG | TEMPERATURE: 98.4 F | HEART RATE: 87 BPM

## 2020-07-09 DIAGNOSIS — K27.9 PUD (PEPTIC ULCER DISEASE): ICD-10-CM

## 2020-07-09 DIAGNOSIS — D12.6 ADENOMATOUS POLYP OF COLON, UNSPECIFIED PART OF COLON: ICD-10-CM

## 2020-07-09 DIAGNOSIS — R13.19 ESOPHAGEAL DYSPHAGIA: Primary | ICD-10-CM

## 2020-07-09 DIAGNOSIS — Z11.59 ENCOUNTER FOR SCREENING FOR OTHER VIRAL DISEASES: ICD-10-CM

## 2020-07-09 PROBLEM — E66.811 CLASS 1 OBESITY WITHOUT SERIOUS COMORBIDITY WITH BODY MASS INDEX (BMI) OF 33.0 TO 33.9 IN ADULT: Status: ACTIVE | Noted: 2017-11-07

## 2020-07-09 PROBLEM — K63.5 COLON POLYPS: Status: ACTIVE | Noted: 2020-07-09

## 2020-07-09 PROBLEM — R73.01 IFG (IMPAIRED FASTING GLUCOSE): Status: ACTIVE | Noted: 2020-07-09

## 2020-07-09 PROBLEM — E66.9 CLASS 1 OBESITY WITHOUT SERIOUS COMORBIDITY WITH BODY MASS INDEX (BMI) OF 33.0 TO 33.9 IN ADULT: Status: ACTIVE | Noted: 2017-11-07

## 2020-07-09 PROBLEM — M47.816 OSTEOARTHRITIS OF LUMBAR SPINE: Status: ACTIVE | Noted: 2017-11-19

## 2020-07-09 PROBLEM — G47.00 PERSISTENT INSOMNIA: Status: ACTIVE | Noted: 2017-05-07

## 2020-07-09 PROBLEM — E78.1 HYPERTRIGLYCERIDEMIA: Status: ACTIVE | Noted: 2020-07-09

## 2020-07-09 PROBLEM — R25.1 TREMOR: Status: ACTIVE | Noted: 2019-05-06

## 2020-07-09 PROBLEM — M54.9 BACK PAIN: Status: ACTIVE | Noted: 2017-01-19

## 2020-07-09 PROCEDURE — 99244 OFF/OP CNSLTJ NEW/EST MOD 40: CPT | Performed by: INTERNAL MEDICINE

## 2020-07-09 NOTE — PATIENT INSTRUCTIONS
Safe Swallow Guidelines    The following are some general guidelines for safe swallowing  Remember that dysphagia patients have individual requirements, so all of these guidelines may not apply to every patient  It is critical to discuss your swallowing instructions with your Speech Language Pathologist and your Physician   Maintain an upright position (as near 90 degrees as possible) whenever eating or drinking   Take small bites -- only 1/2 to 1 teaspoon at a time   Alternate small bites with small sips   Eat slowly  It may also help to eat only one food at a time   Try to avoid large pills, switch to gummies or chewables where possible   Always avoid talking while eating   When one side of the mouth is weak, place food into the stronger side of the mouth   At the end of the meal, check the inside of the cheek for any food that may have been pocketed   Maintain healthy oral care/oral hygiene   Try turning the head down, tucking the chin to the chest, and bending the body forward when swallowing  This often provides greater swallowing ease and helps prevent food from entering the airway   Eat in a relaxed comfortable environment   Following each meal, sit in an upright position (90 degree angle) for at least 30 to 45 minutes  If possible, take a brief walk to increase gravitational digestion   DO NOT drink with a straw (thin or regular) unless specifically advised by your health care professional     The patient is scheduled at [facility] for a Colon/egd with Dr Varghese on 08/14/2020  Suprep prep instructions have been gone over in the office, with the patient, by the MA  The patient is aware that they will receive a call with the arrival time the day prior to procedure and that they will need a  the day of the procedure   I have asked the patient to call with any questions that they might have prior to procedure  She is aware to have covid testing done prior to procedure

## 2020-07-09 NOTE — PROGRESS NOTES
[facility] - Outpatient Consultation  Kateri Severance 63 y o  female MRN: [id number]  Encounter: [id number]      PCP: Roberto Tenorio MD  Referring: Referral Self  [facility], [address]      ASSESSMENT AND PLAN:      1  Esophageal dysphagia  Differential includes esophageal candidiasis versus peptic ulcer stricture versus Schatzki's ring versus eosinophilic esophagitis versus dysmotility  - EGD; Future, to evaluate intraluminal disease, rule out esophageal candidiasis, evaluate for peptic ulcer stricture, erosive GERD  - if EGD is nondiagnostic will schedule for barium swallow to evaluate for dysmotility    2  PUD (peptic ulcer disease)  Follow-up EGD scheduled to evaluate for erosive disease  - EGD; Future    3  Adenomatous polyp of colon, unspecified part of colon  - Colonoscopy; Future  - Na Sulfate-K Sulfate-Mg Sulf (Suprep Bowel Prep Kit) 17 5-3 13-1 6 GM/177ML SOLN; Take 177 mL by mouth once for 1 dose  Dispense: 2 Bottle; Refill: 0    4  Encounter for screening for other viral diseases  - PAT Covid Screening; Future      ______________________________________________________________________    HPI:      Patient is a 82-year-old female referred for dysphagia, and also with a history of colonic polyps  She has obstructive sleep apnea on nocturnal CPAP, hypertriglyceridemia, insomnia, mild persistent asthma on Xopenex/Advair, tremor, vitamin-D deficiency, osteoarthritis  She complains of a three month history of dysphagia and odynophagia  Symptoms are with solid foods, and have been worsening over the past one month, and occurring after every meal   She describes throat pain, and has to drink water to swallow food  She describes food getting stuck in her upper throat area  There is some symptoms of heartburn, and she was previously on Nexium but discontinued it within the last one year    She does use Advair daily, and Xopenex sparingly as needed for history of asthma  She has a father diagnosed with esophageal cancer in his 76s, and mother with history of food impactions  Her last endoscopy was performed in 2017 by Dr Hebert Orozco, demonstrating small antral ulcer  She also had a colonoscopy at this time, which demonstrated three tubular adenomatous colonic polyps  She has regular bowel movements, without rectal bleeding or unintentional weight loss  REVIEW OF SYSTEMS:    CONSTITUTIONAL: Denies any fever, chills, rigors, and weight loss  HEENT: No earache or tinnitus  Denies hearing loss or visual disturbances  CARDIOVASCULAR: No chest pain or palpitations  RESPIRATORY: Denies any cough, hemoptysis, shortness of breath or dyspnea on exertion  GASTROINTESTINAL: As noted in the History of Present Illness  GENITOURINARY: No problems with urination  Denies any hematuria or dysuria  NEUROLOGIC: No dizziness or vertigo, denies headaches  MUSCULOSKELETAL: Denies any muscle or joint pain  SKIN: Denies skin rashes or itching  ENDOCRINE: Denies excessive thirst  Denies intolerance to heat or cold  PSYCHOSOCIAL: Denies depression or anxiety  Denies any recent memory loss  Historical Information   Past Medical History:   Diagnosis Date    Asthma     Chronic pain disorder     back    GERD (gastroesophageal reflux disease)     Hearing loss of right ear     Obesity     Skin cancer of chest, excluding breast     Sleep apnea     wears cpap     Past Surgical History:   Procedure Laterality Date    BLADDER SURGERY      sling     SECTION      EGD AND COLONOSCOPY N/A 2017    Procedure: EGD with bx AND COLONOSCOPY polypectomy x3;  Surgeon: Phyllis Alva MD;  Location: [facility];   Service:      Social History   Social History     Substance and Sexual Activity   Alcohol Use No     Social History     Substance and Sexual Activity   Drug Use No     Social History     Tobacco Use   Smoking Status Never Smoker   Smokeless Tobacco Never Used Family History   Problem Relation Age of Onset    No Known Problems Mother     Cancer Father     No Known Problems Sister     Stomach cancer Maternal Grandmother     No Known Problems Maternal Grandfather     No Known Problems Paternal Grandmother     No Known Problems Paternal Grandfather     No Known Problems Paternal Aunt        Meds/Allergies       Current Outpatient Medications:     fexofenadine (ALLEGRA) 180 MG tablet    fluticasone-salmeterol (ADVAIR) 100-50 mcg/dose    HYDROcodone-acetaminophen (NORCO) 5-325 mg per tablet    levalbuterol (XOPENEX) 0 31 mg/3 mL nebulizer solution    PARoxetine Mesylate 7 5 MG CAPS    Na Sulfate-K Sulfate-Mg Sulf (Suprep Bowel Prep Kit) 17 5-3 13-1 6 GM/177ML SOLN    Allergies   Allergen Reactions    Eggs Or Egg-Derived Products GI Intolerance    Fruit C [Ascorbate]      strawberries  strawberries    Omeprazole      Other reaction(s): nausea    Acetazolamide Rash    Sulfa Antibiotics Rash           Objective     Blood pressure 126/74, pulse 87, temperature 98 4 °F (36 9 °C), weight 81 6 kg (180 lb)  Body mass index is 32 92 kg/m²  PHYSICAL EXAM:      General Appearance:   Alert, cooperative, no distress   HEENT:   Normocephalic, atraumatic, anicteric  Neck:  Supple, symmetrical, trachea midline   Lungs:   Clear to auscultation bilaterally; no rales, rhonchi or wheezing; respirations unlabored    Heart[de-identified]   Regular rate and rhythm; no murmur, rub, or gallop     Abdomen:   Soft, + epigastric tenderness, non-distended; normal bowel sounds; no masses, no organomegaly    Genitalia:   Deferred    Rectal:   Deferred    Extremities:  No cyanosis, clubbing or edema    Pulses:  2+ and symmetric    Skin:  No jaundice, rashes, or lesions    Lymph nodes:  No palpable cervical lymphadenopathy        Lab Results:     No results found for: WBC, HGB, HCT, MCV, PLT    No results found for: NA, K, CL, CO2, ANIONGAP, BUN, CREATININE, GLUCOSE, GLUF, CALCIUM, CORRECTEDCA, AST, ALT, ALKPHOS, PROT, BILITOT, EGFR    No results found for: INR, PROTIME      Radiology Results:   No results found  Portions of the record may have been created with voice recognition software  Occasional wrong word or "sound a like" substitutions may have occurred due to the inherent limitations of voice recognition software  Read the chart carefully and recognize, using context, where substitutions have occurred

## 2020-07-10 ENCOUNTER — ANESTHESIA EVENT (OUTPATIENT)
Dept: GASTROENTEROLOGY | Facility: MEDICAL CENTER | Age: 65
End: 2020-07-10

## 2020-07-22 ENCOUNTER — APPOINTMENT (OUTPATIENT)
Dept: PHYSICAL THERAPY | Facility: MEDICAL CENTER | Age: 65
End: 2020-07-22
Payer: COMMERCIAL

## 2020-07-24 ENCOUNTER — APPOINTMENT (OUTPATIENT)
Dept: PHYSICAL THERAPY | Facility: MEDICAL CENTER | Age: 65
End: 2020-07-24
Payer: COMMERCIAL

## 2020-07-29 ENCOUNTER — APPOINTMENT (OUTPATIENT)
Dept: PHYSICAL THERAPY | Facility: MEDICAL CENTER | Age: 65
End: 2020-07-29
Payer: COMMERCIAL

## 2020-08-10 DIAGNOSIS — Z11.59 ENCOUNTER FOR SCREENING FOR OTHER VIRAL DISEASES: ICD-10-CM

## 2020-08-10 PROCEDURE — U0003 INFECTIOUS AGENT DETECTION BY NUCLEIC ACID (DNA OR RNA); SEVERE ACUTE RESPIRATORY SYNDROME CORONAVIRUS 2 (SARS-COV-2) (CORONAVIRUS DISEASE [COVID-19]), AMPLIFIED PROBE TECHNIQUE, MAKING USE OF HIGH THROUGHPUT TECHNOLOGIES AS DESCRIBED BY CMS-2020-01-R: HCPCS

## 2020-08-11 LAB — SARS-COV-2 RNA SPEC QL NAA+PROBE: NOT DETECTED

## 2020-08-14 ENCOUNTER — HOSPITAL ENCOUNTER (OUTPATIENT)
Dept: GASTROENTEROLOGY | Facility: MEDICAL CENTER | Age: 65
Setting detail: OUTPATIENT SURGERY
Discharge: HOME | End: 2020-08-14
Attending: INTERNAL MEDICINE
Payer: COMMERCIAL

## 2020-08-14 ENCOUNTER — ANESTHESIA (OUTPATIENT)
Dept: GASTROENTEROLOGY | Facility: MEDICAL CENTER | Age: 65
End: 2020-08-14

## 2020-08-14 VITALS
HEART RATE: 74 BPM | TEMPERATURE: 97.2 F | RESPIRATION RATE: 18 BRPM | SYSTOLIC BLOOD PRESSURE: 126 MMHG | OXYGEN SATURATION: 95 % | DIASTOLIC BLOOD PRESSURE: 78 MMHG

## 2020-08-14 DIAGNOSIS — K44.9 HIATAL HERNIA: ICD-10-CM

## 2020-08-14 DIAGNOSIS — K27.9 PUD (PEPTIC ULCER DISEASE): ICD-10-CM

## 2020-08-14 DIAGNOSIS — R13.19 ESOPHAGEAL DYSPHAGIA: ICD-10-CM

## 2020-08-14 DIAGNOSIS — D12.6 ADENOMATOUS POLYP OF COLON, UNSPECIFIED PART OF COLON: ICD-10-CM

## 2020-08-14 DIAGNOSIS — K20.90 ESOPHAGITIS DETERMINED BY ENDOSCOPY: Primary | ICD-10-CM

## 2020-08-14 PROCEDURE — G0121 COLON CA SCRN NOT HI RSK IND: HCPCS | Performed by: INTERNAL MEDICINE

## 2020-08-14 PROCEDURE — NC001 PR NO CHARGE: Performed by: INTERNAL MEDICINE

## 2020-08-14 PROCEDURE — 88305 TISSUE EXAM BY PATHOLOGIST: CPT | Performed by: PATHOLOGY

## 2020-08-14 PROCEDURE — 43251 EGD REMOVE LESION SNARE: CPT | Performed by: INTERNAL MEDICINE

## 2020-08-14 PROCEDURE — 43239 EGD BIOPSY SINGLE/MULTIPLE: CPT | Performed by: INTERNAL MEDICINE

## 2020-08-14 RX ORDER — PROPOFOL 10 MG/ML
INJECTION, EMULSION INTRAVENOUS AS NEEDED
Status: DISCONTINUED | OUTPATIENT
Start: 2020-08-14 | End: 2020-08-14

## 2020-08-14 RX ORDER — SODIUM CHLORIDE 9 MG/ML
125 INJECTION, SOLUTION INTRAVENOUS CONTINUOUS
Status: DISCONTINUED | OUTPATIENT
Start: 2020-08-14 | End: 2020-08-18 | Stop reason: HOSPADM

## 2020-08-14 RX ORDER — ESOMEPRAZOLE MAGNESIUM 40 MG/1
40 CAPSULE, DELAYED RELEASE ORAL
Qty: 180 CAPSULE | Refills: 3 | Status: SHIPPED | OUTPATIENT
Start: 2020-08-14 | End: 2020-11-20 | Stop reason: HOSPADM

## 2020-08-14 RX ORDER — ESOMEPRAZOLE MAGNESIUM 40 MG/1
40 CAPSULE, DELAYED RELEASE ORAL
Qty: 60 CAPSULE | Refills: 3 | Status: SHIPPED | OUTPATIENT
Start: 2020-08-14 | End: 2020-08-14 | Stop reason: SDUPTHER

## 2020-08-14 RX ADMIN — SODIUM CHLORIDE 125 ML/HR: 0.9 INJECTION, SOLUTION INTRAVENOUS at 11:10

## 2020-08-14 RX ADMIN — PROPOFOL 50 MG: 10 INJECTION, EMULSION INTRAVENOUS at 11:26

## 2020-08-14 RX ADMIN — PROPOFOL 50 MG: 10 INJECTION, EMULSION INTRAVENOUS at 11:44

## 2020-08-14 RX ADMIN — PROPOFOL 150 MG: 10 INJECTION, EMULSION INTRAVENOUS at 11:19

## 2020-08-14 RX ADMIN — PROPOFOL 50 MG: 10 INJECTION, EMULSION INTRAVENOUS at 11:41

## 2020-08-14 RX ADMIN — PROPOFOL 50 MG: 10 INJECTION, EMULSION INTRAVENOUS at 11:51

## 2020-08-14 RX ADMIN — PROPOFOL 50 MG: 10 INJECTION, EMULSION INTRAVENOUS at 12:01

## 2020-08-14 RX ADMIN — PROPOFOL 50 MG: 10 INJECTION, EMULSION INTRAVENOUS at 11:38

## 2020-08-14 RX ADMIN — PROPOFOL 50 MG: 10 INJECTION, EMULSION INTRAVENOUS at 11:48

## 2020-08-14 RX ADMIN — PROPOFOL 50 MG: 10 INJECTION, EMULSION INTRAVENOUS at 11:53

## 2020-08-14 RX ADMIN — PROPOFOL 50 MG: 10 INJECTION, EMULSION INTRAVENOUS at 11:34

## 2020-08-14 RX ADMIN — PROPOFOL 50 MG: 10 INJECTION, EMULSION INTRAVENOUS at 11:31

## 2020-08-14 RX ADMIN — PROPOFOL 50 MG: 10 INJECTION, EMULSION INTRAVENOUS at 11:57

## 2020-08-14 RX ADMIN — PROPOFOL 50 MG: 10 INJECTION, EMULSION INTRAVENOUS at 12:05

## 2020-08-14 NOTE — ANESTHESIA PREPROCEDURE EVALUATION
Procedure:  COLONOSCOPY  EGD    Relevant Problems   ANESTHESIA (within normal limits)      CARDIO   (+) Hypertriglyceridemia      ENDO (within normal limits)      GI/HEPATIC   (+) PUD (peptic ulcer disease)      MUSCULOSKELETAL   (+) Back pain   (+) Osteoarthritis of lumbar spine      NEURO/PSYCH (within normal limits)      PULMONARY   (+) Mild persistent asthma without complication   (+) DIPTI (obstructive sleep apnea)        Physical Exam    Airway    Mallampati score: II  TM Distance: <3 FB  Neck ROM: full     Dental   No notable dental hx     Cardiovascular  Rhythm: regular, Rate: normal,     Pulmonary  Pulmonary exam normal     Other Findings        Anesthesia Plan  ASA Score- 3     Anesthesia Type- IV sedation with anesthesia with ASA Monitors  Additional Monitors:   Airway Plan:           Plan Factors-    Induction- intravenous  Postoperative Plan-     Informed Consent- Anesthetic plan and risks discussed with patient

## 2020-08-14 NOTE — H&P
History and Physical -  [facility]  Mila Orona 63 y o  female MRN: [id number]    HPI: Mila Orona is a 63y o  year old female who presents for esophageal dysphagia, peptic ulcer disease  Patient with esophageal dysphagia will undergo EGD  Previously had a endoscopy in 2017 which showed small antral ulcer  That time also had a colonoscopy which showed 3 tubular adenomas  Plan for EGD colonoscopy today  REVIEW OF SYSTEMS: Per the HPI, and otherwise unremarkable  Historical Information   Past Medical History:   Diagnosis Date    Asthma     Chronic pain disorder     back    GERD (gastroesophageal reflux disease)     Hearing loss of right ear     Obesity     Skin cancer of chest, excluding breast     Sleep apnea     wears cpap     Past Surgical History:   Procedure Laterality Date    BLADDER SURGERY      sling     SECTION      EGD AND COLONOSCOPY N/A 2017    Procedure: EGD with bx AND COLONOSCOPY polypectomy x3;  Surgeon: Torsten Sims MD;  Location: [facility];   Service:      Social History   Social History     Substance and Sexual Activity   Alcohol Use No     Social History     Substance and Sexual Activity   Drug Use No     Social History     Tobacco Use   Smoking Status Never Smoker   Smokeless Tobacco Never Used     Family History   Problem Relation Age of Onset    No Known Problems Mother     Cancer Father     No Known Problems Sister     Stomach cancer Maternal Grandmother     No Known Problems Maternal Grandfather     No Known Problems Paternal Grandmother     No Known Problems Paternal Grandfather     No Known Problems Paternal Aunt        Meds/Allergies     (Not in a hospital admission)      Allergies   Allergen Reactions    Eggs Or Egg-Derived Products GI Intolerance    Fruit C [Ascorbate]      strawberries  strawberries    Omeprazole      Other reaction(s): nausea    Acetazolamide Rash    Sulfa Antibiotics Rash       Objective There were no vitals taken for this visit  PHYSICAL EXAM    Gen: NAD  CV: RRR  CHEST: Clear  ABD: soft, NT/ND  EXT: no edema      ASSESSMENT/PLAN:  This is a 63y o  year old female here for EGD and colonoscopy, and she is stable and optimized for her procedure

## 2020-08-14 NOTE — DISCHARGE INSTRUCTIONS
Upper Endoscopy   WHAT YOU NEED TO KNOW:   An upper endoscopy is also called an upper gastrointestinal (GI) endoscopy, or an esophagogastroduodenoscopy (EGD)  You may feel bloated, gassy, or have some abdominal discomfort after your procedure  Your throat may be sore for 24 to 36 hours  You may burp or pass gas from air that is still inside your body  DISCHARGE INSTRUCTIONS:   Call 911 if:   · You have sudden chest pain or trouble breathing  Seek care immediately if:   · You feel dizzy or faint  · You have trouble swallowing  · You have severe throat pain  · Your bowel movements are very dark or black  · Your abdomen is hard and firm and you have severe pain  · You vomit blood  Contact your healthcare provider if:   · You feel full or bloated and cannot burp or pass gas  · You have not had a bowel movement for 3 days after your procedure  · You have neck pain  · You have a fever or chills  · You have nausea or are vomiting  · You have a rash or hives  · You have questions or concerns about your endoscopy  Relieve a sore throat:  Suck on throat lozenges or crushed ice  Gargle with a small amount of warm salt water  Mix 1 teaspoon of salt and 1 cup of warm water to make salt water  Relieve gas and discomfort from bloating:  Lie on your right side with a heating pad on your abdomen  Take short walks to help pass gas  Eat small meals until bloating is relieved  Rest after your procedure:  Do not drive or make important decisions until the day after your procedure  Return to your normal activity as directed  You can usually return to work the day after your procedure  Follow up with your healthcare provider as directed:  Write down your questions so you remember to ask them during your visits  © 2017 [employer] Information is for End User's use only and may not be sold, redistributed or otherwise used for commercial purposes   All illustrations and images included in CareNotes® are the copyrighted property of [employer] M [employer] Inc  or Pavel Murphy  The above information is an  only  It is not intended as medical advice for individual conditions or treatments  Talk to your doctor, nurse or pharmacist before following any medical regimen to see if it is safe and effective for you  Colonoscopy   WHAT YOU NEED TO KNOW:   A colonoscopy is a procedure to examine the inside of your colon (intestine) with a scope  Polyps or tissue growths may have been removed during your colonoscopy  It is normal to feel bloated and to have some abdominal discomfort  You should be passing gas  If you have hemorrhoids or you had polyps removed, you may have a small amount of bleeding  DISCHARGE INSTRUCTIONS:   Seek care immediately if:   · You have a large amount of bright red blood in your bowel movements  · Your abdomen is hard and firm and you have severe pain  · You have sudden trouble breathing  Contact your healthcare provider if:   · You develop a rash or hives  · You have a fever within 24 hours of your procedure  · You have not had a bowel movement for 3 days after your procedure  · You have questions or concerns about your condition or care  Activity:   · Do not lift, strain, or run  for 3 days after your procedure  · Rest after your procedure  You have been given medicine to relax you  Do not  drive or make important decisions until the day after your procedure  Return to your normal activity as directed  · Relieve gas and discomfort from bloating  by lying on your right side with a heating pad on your abdomen  You may need to take short walks to help the gas move out  Eat small meals until bloating is relieved  If you had polyps removed: For 7 days after your procedure:  · Do not  take aspirin  · Do not  go on long car rides  Help prevent constipation:   · Eat a variety of healthy foods    Healthy foods include fruit, vegetables, whole-grain breads, low-fat dairy products, beans, lean meat, and fish  Ask if you need to be on a special diet  Your healthcare provider may recommend that you eat high-fiber foods such as cooked beans  Fiber helps you have regular bowel movements  · Drink liquids as directed  Adults should drink between 9 and 13 eight-ounce cups of liquid every day  Ask what amount is best for you  For most people, good liquids to drink are water, juice, and milk  · Exercise as directed  Talk to your healthcare provider about the best exercise plan for you  Exercise can help prevent constipation, decrease your blood pressure and improve your health  Follow up with your healthcare provider as directed:  Write down your questions so you remember to ask them during your visits  © 2017 [facility] Information is for End User's use only and may not be sold, redistributed or otherwise used for commercial purposes  All illustrations and images included in CareNotes® are the copyrighted property of A D A M , Inc  or Pavel Murphy  The above information is an  only  It is not intended as medical advice for individual conditions or treatments  Talk to your doctor, nurse or pharmacist before following any medical regimen to see if it is safe and effective for you  Hiatal Hernia   WHAT YOU NEED TO KNOW:   What is a hiatal hernia? A hiatal hernia is a condition that causes part of your stomach to bulge through the hiatus (small opening) in your diaphragm  The part of the stomach may move up and down, or it may get trapped above the diaphragm  What increases my risk for a hiatal hernia? The exact cause of a hiatal hernia is not known  You may have been born with a large hiatus   The following may increase your risk of a hiatal hernia:  · Obesity    · Older age    · Medical conditions such as diverticulosis or esophagitis    · Previous surgery of the esophagus or stomach or trauma such as from a motor vehicle accident  What are the types of hiatal hernia? · Type I (sliding hiatal hernia): A portion of the stomach slides in and out of the hiatus  This type is the most common and usually causes gastroesophageal reflux disease (GERD)  GERD occurs when the esophageal sphincter does not close properly and causes acid reflux  The esophageal sphincter is the lower muscle of the esophagus  · Type II (paraesophageal hiatal hernia):  Type II hiatal hernia forms when a part of the stomach squeezes through the hiatus and lies next to the esophagus  · Type III (combined):  Type III hiatal hernia is a combination of a sliding and a paraesophageal hiatal hernia  · Type IV (complex paraesophageal hiatal hernia): The whole stomach, the small and large bowels, spleen, pancreas, or liver is pushed up into the chest   What are the signs and symptoms of a hiatal hernia? The most common symptom is heartburn  This usually occurs after meals and spreads to your neck, jaw, or shoulder  You may have no signs or symptoms, or you may have any of the following:  · Abdominal pain, especially in the area just above your navel    · Bitter or acid taste in your mouth    · Trouble swallowing    · Coughing or hoarseness    · Chest pain or shortness of breath that occurs after eating    · Frequent burping or hiccups    · Uncomfortable feeling of fullness after eating  How is a hiatal hernia diagnosed? · An upper GI series test  includes x-rays of your esophagus, stomach, and your small intestines  It is also called a barium swallow test  You will be given barium (a chalky liquid) to drink before the pictures are taken  This liquid helps your stomach and intestines show up better on the x-rays  An upper GI series can show if you have an ulcer, a blocked intestine, or other problems  · An endoscopy  uses a scope to see the inside of your digestive tract   A scope is a long, bendable tube with a light on the end of it  A camera may be hooked to the scope to take pictures  How is a hiatal hernia treated? Treatment depends on the type of hiatal hernia you have and on your symptoms  You may not need any treatment  You may need any of the following:  · Medicines  may be given to relieve heartburn symptoms  These medicines help to decrease or block stomach acid  You may also be given medicines that help to tighten the esophageal sphincter  · Surgery  may be done when medicines cannot control your symptoms, or other problems are present  Your healthcare provider may also suggest surgery depending on the type of hernia you have  Your healthcare provider can put your stomach back into its normal location  He may make the hiatus (hole) smaller and anchor your stomach in your abdomen  Fundoplication is a surgery that wraps the upper part of the stomach around the esophageal sphincter to strengthen it  How can I manage symptoms? The following nutrition and lifestyle changes may be recommended to relieve symptoms of heartburn  · Avoid foods that make your symptoms worse  These may include spicy foods, fruit juices, alcohol, caffeine, chocolate, and mint  · Eat several small meals during the day  Small meals give your stomach less food to digest     · Avoid lying down and bending forward after you eat  Do not eat meals 2 to 3 hours before bedtime  This decreases your risk for reflux  · Maintain a healthy weight  If you are overweight, weight loss may help relieve your symptoms  · Sleep with your head elevated  at least 6 inches  · Do not smoke  Smoking can increase your symptoms of heartburn  When should I seek immediate care? · You have severe abdominal pain  · You try to vomit but nothing comes out (retching)  · You have severe chest pain and sudden trouble breathing  · Your bowel movements are black or bloody      · Your vomit looks like coffee grounds or has blood in it  When should I contact my healthcare provider? · Your symptoms are getting worse  · You have nausea, and you are vomiting  · You are losing weight without trying  · You have questions or concerns about your condition or care  CARE AGREEMENT:   You have the right to help plan your care  Learn about your health condition and how it may be treated  Discuss treatment options with your caregivers to decide what care you want to receive  You always have the right to refuse treatment  The above information is an  only  It is not intended as medical advice for individual conditions or treatments  Talk to your doctor, nurse or pharmacist before following any medical regimen to see if it is safe and effective for you  © 2017 [address] Information is for End User's use only and may not be sold, redistributed or otherwise used for commercial purposes  All illustrations and images included in CareNotes® are the copyrighted property of [employer]  or Pavel Murphy  [employer]   WHAT YOU NEED TO KNOW:   What is diverticulosis? Diverticulosis is a condition that causes small pockets called diverticula to form in your intestine  These pockets make it difficult for bowel movements to pass through your digestive system  What causes diverticulosis? Diverticula form when muscles have to work hard to move bowel movements through the intestine  The force causes bulges to form at weak areas in the intestine  This may happen if you eat foods that are low in fiber  Fiber helps give your bowel movements more bulk so they are larger and easier to move through your colon  The following may increase your risk of diverticulosis:  · A history of constipation    · Age 36 or older    · Obesity    · Lack of exercise  What are the signs and symptoms of diverticulosis? Diverticulosis usually does not cause any signs or symptoms   It may cause any of the following in some people:  · Pain or discomfort in your lower abdomen    · Abdominal bloating    · Constipation or diarrhea  How is diverticulosis diagnosed? Your healthcare provider will examine you and ask about your bowel movements, diet, and symptoms  He or she will also ask about any medical conditions you have or medicines you take  You may need any of the following:  · Blood tests  may be done to check for signs of inflammation  · A barium enema  is an x-ray of your colon that may show diverticula  A tube is put into your anus, and a liquid called barium is put through the tube  Barium is used so that healthcare providers can see your colon more clearly  · Flexible sigmoidoscopy  is a test to look for any changes in your lower intestines and rectum  It may also show the cause of any bleeding or pain  A soft, bendable tube with a light on the end will be put into your anus  It will then be moved forward into your intestine  · A colonoscopy  is used to look at your whole colon  A scope (long bendable tube with a light on the end) is used to take pictures  This test may show diverticula  · A CT scan , or CAT scan, may show diverticula  You may be given contrast liquid before the scan  Tell the healthcare provider if you have ever had an allergic reaction to contrast liquid  How is diverticulosis managed? The goal of treatment is to manage any symptoms you have and prevent other problems such as diverticulitis  Diverticulitis is swelling or infection of the diverticula  Your healthcare provider may recommend any of the following:  · Eat a variety of high-fiber foods  High-fiber foods help you have regular bowel movements  High-fiber foods include cooked beans, fruits, vegetables, and some cereals  Most adults need 25 to 35 grams of fiber each day  Your healthcare provider may recommend that you have more  Ask your healthcare provider how much fiber you need  Increase fiber slowly   You may have abdominal discomfort, bloating, and gas if you add fiber to your diet too quickly  You may need to take a fiber supplement if you are not getting enough fiber from food  · Medicines  to soften your bowel movements may be given  You may also need medicines to treat symptoms such as bloating and pain  · Drink liquids as directed  You may need to drink 2 to 3 liters (8 to 12 cups) of liquids every day  Ask your healthcare provider how much liquid to drink each day and which liquids are best for you  · Apply heat  on your abdomen for 20 to 30 minutes every 2 hours for as many days as directed  Heat helps decrease pain and muscle spasms  How can I help prevent diverticulitis or other symptoms? The following may help decrease your risk for diverticulitis or symptoms, such as bleeding  Talk to your provider about these or other things you can do to prevent problems that may occur with diverticulosis  · Exercise regularly  Ask your healthcare provider about the best exercise plan for you  Exercise can help you have regular bowel movements  Get 30 minutes of exercise on most days of the week  · Maintain a healthy weight  Ask your healthcare provider how much you should weigh  Ask him or her to help you create a weight loss plan if you are overweight  · Do not smoke  Nicotine and other chemicals in cigarettes increase your risk for diverticulitis  Ask your healthcare provider for information if you currently smoke and need help to quit  E-cigarettes or smokeless tobacco still contain nicotine  Talk to your healthcare provider before you use these products  · Ask your healthcare provider if it is safe to take NSAIDs  NSAIDs may increase your risk of diverticulitis  When should I seek immediate care? · You have severe pain on the left side of your lower abdomen  · Your bowel movements are bright or dark red  When should I contact my healthcare provider?    · You have a fever and chills  · You feel dizzy or lightheaded  · You have nausea, or you are vomiting  · You have a change in your bowel movements  · You have questions or concerns about your condition or care  CARE AGREEMENT:   You have the right to help plan your care  Learn about your health condition and how it may be treated  Discuss treatment options with your caregivers to decide what care you want to receive  You always have the right to refuse treatment  The above information is an  only  It is not intended as medical advice for individual conditions or treatments  Talk to your doctor, nurse or pharmacist before following any medical regimen to see if it is safe and effective for you  © 2017 [facility] [facility] Information is for End User's use only and may not be sold, redistributed or otherwise used for commercial purposes  All illustrations and images included in CareNotes® are the copyrighted property of A D A M , Inc  or Pavel Murphy

## 2020-08-21 ENCOUNTER — TELEPHONE (OUTPATIENT)
Dept: GASTROENTEROLOGY | Facility: MEDICAL CENTER | Age: 65
End: 2020-08-21

## 2020-08-21 NOTE — TELEPHONE ENCOUNTER
Per report, Repeat EGD in three months to for follow esophagitis healing and dilation of Schatzki's ring after esophagitis healing    Please contact patient to schedule procedure @ [address]

## 2020-08-21 NOTE — TELEPHONE ENCOUNTER
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

## 2020-08-26 ENCOUNTER — PREP FOR PROCEDURE (OUTPATIENT)
Dept: GASTROENTEROLOGY | Facility: MEDICAL CENTER | Age: 65
End: 2020-08-26

## 2020-08-26 DIAGNOSIS — R13.19 ESOPHAGEAL DYSPHAGIA: Primary | ICD-10-CM

## 2020-08-26 NOTE — TELEPHONE ENCOUNTER
Pt is scheduled at [facility] for a recall egd with dr Vitaliy Roca on 11/20/20, I mailed out instructions to pt home  Patient is aware she will need a  to and from   She will get a call the day before with an exact time for arrival   Pt requested am but is aware preference to be given to diabetic pts

## 2020-09-01 ENCOUNTER — ANESTHESIA EVENT (OUTPATIENT)
Dept: GASTROENTEROLOGY | Facility: MEDICAL CENTER | Age: 65
End: 2020-09-01

## 2020-09-04 ENCOUNTER — OFFICE VISIT (OUTPATIENT)
Dept: BARIATRICS | Facility: CLINIC | Age: 65
End: 2020-09-04
Payer: COMMERCIAL

## 2020-09-04 VITALS
TEMPERATURE: 96.9 F | RESPIRATION RATE: 16 BRPM | HEART RATE: 89 BPM | DIASTOLIC BLOOD PRESSURE: 78 MMHG | WEIGHT: 184 LBS | SYSTOLIC BLOOD PRESSURE: 108 MMHG | HEIGHT: 62 IN | BODY MASS INDEX: 33.86 KG/M2

## 2020-09-04 DIAGNOSIS — K21.00 GASTROESOPHAGEAL REFLUX DISEASE WITH ESOPHAGITIS: Primary | ICD-10-CM

## 2020-09-04 PROCEDURE — 99213 OFFICE O/P EST LOW 20 MIN: CPT | Performed by: SURGERY

## 2020-09-04 NOTE — PROGRESS NOTES
OFFICE VISIT - BARIATRIC SURGERY  Kateri Severance 63 y o  female MRN: [id number]  Unit/Bed#:  Encounter: [id number]      HPI:  Kateri Severance is a 63 y o  female with a longstanding history of heartburn and dysphagia that got worse recently presents to discuss her surgical options  She denies symptoms of regurgitation  She is currently on Nexium b i d  That is controlling her symptoms  An EGD was done and showed a large type 3 hiatal hernia, 10 or more polyps in the fundus of the stomach and signs of esophagitis  Pathology showed benign polyps and Cody's esophagus    Review of Systems   Constitutional: Negative for activity change, appetite change, chills and fever  Eyes: Negative  Respiratory: Negative for apnea, cough, choking and shortness of breath  Cardiovascular: Negative for chest pain and palpitations  Gastrointestinal: Negative for abdominal distention, abdominal pain, nausea and vomiting  Heartburn, dysphagia   Genitourinary: Negative for difficulty urinating, dysuria and frequency  Neurological: Negative  Negative for dizziness, seizures and syncope  Historical Information   Past Medical History:   Diagnosis Date    Asthma     Chronic pain disorder     back    Colon polyp     CPAP (continuous positive airway pressure) dependence     GERD (gastroesophageal reflux disease)     Hearing loss of right ear     Obesity     Skin cancer of chest, excluding breast     Sleep apnea     wears cpap     Past Surgical History:   Procedure Laterality Date    BLADDER SURGERY      sling     SECTION      EGD AND COLONOSCOPY N/A 2017    Procedure: EGD with bx AND COLONOSCOPY polypectomy x3;  Surgeon: Jean-Pierre Peralta MD;  Location: [facility];   Service:     HAND SURGERY       Social History   Social History     Substance and Sexual Activity   Alcohol Use No     Social History     Substance and Sexual Activity   Drug Use No     Social History     Tobacco Use   Smoking Status Never Smoker   Smokeless Tobacco Never Used       Objective       Current Vitals:   Blood Pressure: 108/78 (09/04/20 1122)  Pulse: 89 (09/04/20 1122)  Temperature: (!) 96 9 °F (36 1 °C) (09/04/20 1122)  Respirations: 16 (09/04/20 1122)  Height: 5' 2" (157 5 cm) (09/04/20 1122)  Weight - Scale: 83 5 kg (184 lb) (09/04/20 1122)    Invasive Devices     None                 Physical Exam  Constitutional:       Appearance: Normal appearance  HENT:      Head: Normocephalic and atraumatic  Neck:      Musculoskeletal: Normal range of motion  Cardiovascular:      Rate and Rhythm: Normal rate and regular rhythm  Heart sounds: Normal heart sounds  Pulmonary:      Effort: Pulmonary effort is normal       Breath sounds: Normal breath sounds  Abdominal:      General: Bowel sounds are normal  There is no distension  Palpations: Abdomen is soft  Tenderness: There is no abdominal tenderness  There is no guarding or rebound  Skin:     General: Skin is warm  Neurological:      Mental Status: She is alert and oriented to person, place, and time  Pathology, and Other Studies: I have personally reviewed pertinent reports  Assessment/PLAN:    Darcy Giang is a 63 y o  female with a longstanding history of heartburn and dysphagia that got worse recently presents to discuss her surgical options  She denies symptoms of regurgitation  She is currently on Nexium b i d  That is controlling her symptoms  An EGD was done and showed a large type 3 hiatal hernia, 10 or more polyps in the fundus of the stomach and signs of esophagitis  Pathology showed benign polyps and Cody's esophagus      ---------------------------------------------------    The patient's workup thus far was reviewed, and includes:    UGI        EGD    · The middle third of the esophagus appeared normal  Performed random biopsy    · Non-obstructing Schatzki ring in the GE junction (37 cm from the incisors); performed cold biopsy that obliterated tissue  · Moderate erythematous mucosa with erosion in the GE junction; performed cold biopsy  · Large herniation of both GE junction and stomach (type III hiatal hernia)  · Ten or more polyps measuring 10 mm or larger in the fundus of the stomach; performed cold biopsy; removed by cold snare  · Moderate erythematous and ulcerated mucosa with erosion in the antrum; performed cold biopsy  · The duodenal bulb and 2nd part of the duodenum appeared normal     Pathology    A  Stomach, biopsy:     - Antral and oxyntic mucosa with chronic inactive gastritis  - No Helicobacter pylori organisms identified      - No intestinal metaplasia, dysplasia or neoplasia identified        B  Stomach, polyps, cold snare/forceps:     - Fundic gland polyps  - No intestinal metaplasia, dysplasia or carcinoma identified      C  Esophagogastric junction, biopsy:     - Cardiac gastric and squamous junctional mucosa with intestinal metaplasia (goblet cells present) consistent with       Cody's esophagus      - No dysplasia or neoplasia identified      D  Esophagus, biopsy:     - Squamous esophageal mucosa without significant pathologic abnormalities  - No intraepithelial eosinophils identified      - No gastric type mucosa, dysplasia or neoplasia identified  MANOMETRY/pH Study      PLAN:     Who will get a manometry study and see her again in the office to discuss the surgical options  We would probably offer her a hiatal hernia repair with mesh with Nissen fundoplication                Terri Torres MD  Bariatric Surgery Fellow  9/4/2020  11:47 AM

## 2020-09-09 ENCOUNTER — TELEPHONE (OUTPATIENT)
Dept: OTHER | Facility: OTHER | Age: 65
End: 2020-09-09

## 2020-09-09 NOTE — TELEPHONE ENCOUNTER
Inbound Calls to Reschedule/Cancel Appointments   Patient Details:  Yeison Adamson  1955  [id number]     Who is calling? [address]   Date of original appointment 9/10/2020   Visit Type Follow Up   Who is the Provider and Location? Dr Yomaira Hutchinson @ [facility]   The patient would like to cancel, reschedule or make into a virtual appointment? Reschedule   Reason for rescheduling or cancelation? Patient stated she was schedule for a procedure at the same time as the apt

## 2020-09-10 ENCOUNTER — HOSPITAL ENCOUNTER (OUTPATIENT)
Dept: GASTROENTEROLOGY | Facility: HOSPITAL | Age: 65
Discharge: HOME | End: 2020-09-10
Payer: COMMERCIAL

## 2020-09-10 VITALS
HEART RATE: 73 BPM | SYSTOLIC BLOOD PRESSURE: 133 MMHG | TEMPERATURE: 98.1 F | DIASTOLIC BLOOD PRESSURE: 93 MMHG | OXYGEN SATURATION: 94 % | RESPIRATION RATE: 16 BRPM

## 2020-09-10 DIAGNOSIS — K21.00 GASTROESOPHAGEAL REFLUX DISEASE WITH ESOPHAGITIS: ICD-10-CM

## 2020-09-10 PROCEDURE — 91020 GASTRIC MOTILITY STUDIES: CPT

## 2020-09-14 ENCOUNTER — HOSPITAL ENCOUNTER (OUTPATIENT)
Dept: RADIOLOGY | Facility: HOSPITAL | Age: 65
Discharge: HOME | End: 2020-09-14
Payer: COMMERCIAL

## 2020-09-14 DIAGNOSIS — K44.9 HIATAL HERNIA: ICD-10-CM

## 2020-09-14 PROCEDURE — 74220 X-RAY XM ESOPHAGUS 1CNTRST: CPT

## 2020-09-16 PROCEDURE — 91010 ESOPHAGUS MOTILITY STUDY: CPT | Performed by: INTERNAL MEDICINE

## 2020-11-20 ENCOUNTER — HOSPITAL ENCOUNTER (OUTPATIENT)
Dept: GASTROENTEROLOGY | Facility: MEDICAL CENTER | Age: 65
Setting detail: OUTPATIENT SURGERY
Discharge: HOME | End: 2020-11-20
Attending: INTERNAL MEDICINE
Payer: COMMERCIAL

## 2020-11-20 ENCOUNTER — ANESTHESIA (OUTPATIENT)
Dept: GASTROENTEROLOGY | Facility: MEDICAL CENTER | Age: 65
End: 2020-11-20

## 2020-11-20 VITALS
OXYGEN SATURATION: 94 % | HEART RATE: 91 BPM | SYSTOLIC BLOOD PRESSURE: 102 MMHG | RESPIRATION RATE: 16 BRPM | DIASTOLIC BLOOD PRESSURE: 66 MMHG

## 2020-11-20 VITALS — HEART RATE: 103 BPM

## 2020-11-20 DIAGNOSIS — K27.9 PUD (PEPTIC ULCER DISEASE): Primary | ICD-10-CM

## 2020-11-20 DIAGNOSIS — R13.19 ESOPHAGEAL DYSPHAGIA: ICD-10-CM

## 2020-11-20 DIAGNOSIS — K20.90 ESOPHAGITIS DETERMINED BY ENDOSCOPY: ICD-10-CM

## 2020-11-20 DIAGNOSIS — K44.9 HIATAL HERNIA: ICD-10-CM

## 2020-11-20 PROCEDURE — 43239 EGD BIOPSY SINGLE/MULTIPLE: CPT | Performed by: INTERNAL MEDICINE

## 2020-11-20 PROCEDURE — 88305 TISSUE EXAM BY PATHOLOGIST: CPT | Performed by: PATHOLOGY

## 2020-11-20 RX ORDER — GABAPENTIN 100 MG/1
100 CAPSULE ORAL 3 TIMES DAILY
COMMUNITY
End: 2021-11-12

## 2020-11-20 RX ORDER — PROPOFOL 10 MG/ML
INJECTION, EMULSION INTRAVENOUS AS NEEDED
Status: DISCONTINUED | OUTPATIENT
Start: 2020-11-20 | End: 2020-11-20

## 2020-11-20 RX ORDER — SODIUM CHLORIDE 9 MG/ML
125 INJECTION, SOLUTION INTRAVENOUS CONTINUOUS
Status: DISCONTINUED | OUTPATIENT
Start: 2020-11-20 | End: 2020-11-24 | Stop reason: HOSPADM

## 2020-11-20 RX ADMIN — PROPOFOL 150 MG: 10 INJECTION, EMULSION INTRAVENOUS at 08:01

## 2020-11-20 RX ADMIN — PROPOFOL 50 MG: 10 INJECTION, EMULSION INTRAVENOUS at 08:04

## 2020-11-20 RX ADMIN — SODIUM CHLORIDE 125 ML/HR: 0.9 INJECTION, SOLUTION INTRAVENOUS at 07:46

## 2020-11-20 RX ADMIN — PROPOFOL 50 MG: 10 INJECTION, EMULSION INTRAVENOUS at 08:05

## 2020-11-26 DIAGNOSIS — K20.90 ESOPHAGITIS DETERMINED BY ENDOSCOPY: ICD-10-CM

## 2020-11-26 DIAGNOSIS — K27.9 PUD (PEPTIC ULCER DISEASE): ICD-10-CM

## 2020-11-26 DIAGNOSIS — R13.19 ESOPHAGEAL DYSPHAGIA: ICD-10-CM

## 2020-11-26 DIAGNOSIS — K44.9 HIATAL HERNIA: ICD-10-CM

## 2020-12-03 DIAGNOSIS — R13.19 ESOPHAGEAL DYSPHAGIA: ICD-10-CM

## 2020-12-03 DIAGNOSIS — K27.9 PUD (PEPTIC ULCER DISEASE): ICD-10-CM

## 2020-12-03 DIAGNOSIS — K44.9 HIATAL HERNIA: ICD-10-CM

## 2020-12-03 DIAGNOSIS — K20.90 ESOPHAGITIS DETERMINED BY ENDOSCOPY: ICD-10-CM

## 2021-03-10 DIAGNOSIS — Z23 ENCOUNTER FOR IMMUNIZATION: ICD-10-CM

## 2021-11-12 ENCOUNTER — OFFICE VISIT (OUTPATIENT)
Dept: OBGYN CLINIC | Facility: MEDICAL CENTER | Age: 66
End: 2021-11-12
Payer: COMMERCIAL

## 2021-11-12 VITALS
HEIGHT: 62 IN | WEIGHT: 185 LBS | SYSTOLIC BLOOD PRESSURE: 120 MMHG | BODY MASS INDEX: 34.04 KG/M2 | DIASTOLIC BLOOD PRESSURE: 70 MMHG

## 2021-11-12 DIAGNOSIS — Z12.31 ENCOUNTER FOR SCREENING MAMMOGRAM FOR MALIGNANT NEOPLASM OF BREAST: ICD-10-CM

## 2021-11-12 DIAGNOSIS — Z01.419 ENCOUNTER FOR WELL WOMAN EXAM WITH ROUTINE GYNECOLOGICAL EXAM: Primary | ICD-10-CM

## 2021-11-12 PROCEDURE — S0612 ANNUAL GYNECOLOGICAL EXAMINA: HCPCS | Performed by: OBSTETRICS & GYNECOLOGY

## 2021-12-09 ENCOUNTER — HOSPITAL ENCOUNTER (OUTPATIENT)
Dept: MAMMOGRAPHY | Facility: MEDICAL CENTER | Age: 66
Discharge: HOME | End: 2021-12-09
Payer: COMMERCIAL

## 2021-12-09 VITALS — HEIGHT: 62 IN | BODY MASS INDEX: 34.04 KG/M2 | WEIGHT: 185 LBS

## 2021-12-09 DIAGNOSIS — Z12.31 ENCOUNTER FOR SCREENING MAMMOGRAM FOR MALIGNANT NEOPLASM OF BREAST: ICD-10-CM

## 2021-12-09 PROCEDURE — 77063 BREAST TOMOSYNTHESIS BI: CPT

## 2021-12-09 PROCEDURE — 77067 SCR MAMMO BI INCL CAD: CPT

## 2022-06-23 ENCOUNTER — TELEPHONE (OUTPATIENT)
Dept: GASTROENTEROLOGY | Facility: CLINIC | Age: 67
End: 2022-06-23

## 2022-06-23 NOTE — TELEPHONE ENCOUNTER
As per patient's biopsies in 2020:    biopsies of the ring were benign, but consistent with a pre-pre-precancerous condition (NOT Cody's esophagus, but a step before it)  This is something we should continue to keep an eye on, recommend repeat EGD in 1 year  She should continue nexium at 20 mg daily (or higher if symptoms require) as this will help prevent this from progressing  Can you please reach out to the patient to schedule a repeat EGD ? Thank you!

## 2022-06-23 NOTE — TELEPHONE ENCOUNTER
----- Message from Yimi Nielsen sent at 6/22/2022  9:05 PM EDT -----  Regarding: EDG  can you tell me if I am to schedule another EDG I thought it was 2 years for follow up,,

## 2022-07-20 NOTE — TELEPHONE ENCOUNTER
Left several voice messages for pt re: scheduling follow up EGD   Provided pt w/ direct phone # in scheduling on pt's phone message

## 2022-10-03 ENCOUNTER — TELEPHONE (OUTPATIENT)
Dept: GASTROENTEROLOGY | Facility: CLINIC | Age: 67
End: 2022-10-03

## 2022-10-03 NOTE — TELEPHONE ENCOUNTER
Patients GI provider:  Dr Justyna iCntron / Pt looking to switch provider due to location    Number to return call: [phone]     Reason for call: Pt calling to schedule her EGD  Pt requesting to be scheduled in [facility]      Scheduled procedure/appointment date if applicable: N/A

## 2022-10-06 NOTE — TELEPHONE ENCOUNTER
Anesthesia reviewed the chart and they feel it would be best to have her EGD done in a hospital setting   I called pt back and LVM explaining what anesthesia said and offered times in [facility]

## 2022-10-25 ENCOUNTER — HOSPITAL ENCOUNTER (OUTPATIENT)
Dept: GASTROENTEROLOGY | Facility: HOSPITAL | Age: 67
Setting detail: OUTPATIENT SURGERY
Discharge: HOME | End: 2022-10-25
Payer: COMMERCIAL

## 2022-10-25 ENCOUNTER — ANESTHESIA (OUTPATIENT)
Dept: GASTROENTEROLOGY | Facility: HOSPITAL | Age: 67
End: 2022-10-25

## 2022-10-25 ENCOUNTER — ANESTHESIA EVENT (OUTPATIENT)
Dept: GASTROENTEROLOGY | Facility: HOSPITAL | Age: 67
End: 2022-10-25

## 2022-10-25 VITALS
DIASTOLIC BLOOD PRESSURE: 79 MMHG | TEMPERATURE: 97.2 F | RESPIRATION RATE: 18 BRPM | SYSTOLIC BLOOD PRESSURE: 123 MMHG | OXYGEN SATURATION: 95 % | HEART RATE: 83 BPM

## 2022-10-25 DIAGNOSIS — K44.9 HIATAL HERNIA: ICD-10-CM

## 2022-10-25 DIAGNOSIS — K27.9 PUD (PEPTIC ULCER DISEASE): ICD-10-CM

## 2022-10-25 DIAGNOSIS — R13.19 ESOPHAGEAL DYSPHAGIA: ICD-10-CM

## 2022-10-25 DIAGNOSIS — K20.90 ESOPHAGITIS DETERMINED BY ENDOSCOPY: ICD-10-CM

## 2022-10-25 RX ORDER — SODIUM CHLORIDE, SODIUM LACTATE, POTASSIUM CHLORIDE, CALCIUM CHLORIDE 600; 310; 30; 20 MG/100ML; MG/100ML; MG/100ML; MG/100ML
125 INJECTION, SOLUTION INTRAVENOUS CONTINUOUS
Status: DISCONTINUED | OUTPATIENT
Start: 2022-10-25 | End: 2022-10-25

## 2022-10-25 RX ORDER — PROPOFOL 10 MG/ML
INJECTION, EMULSION INTRAVENOUS AS NEEDED
Status: DISCONTINUED | OUTPATIENT
Start: 2022-10-25 | End: 2022-10-25

## 2022-10-25 RX ORDER — DIPHENHYDRAMINE HCL 25 MG
25 TABLET ORAL EVERY 6 HOURS PRN
COMMUNITY

## 2022-10-25 RX ORDER — GLYCOPYRROLATE 0.2 MG/ML
INJECTION INTRAMUSCULAR; INTRAVENOUS AS NEEDED
Status: DISCONTINUED | OUTPATIENT
Start: 2022-10-25 | End: 2022-10-25

## 2022-10-25 RX ORDER — LIDOCAINE HYDROCHLORIDE 20 MG/ML
INJECTION, SOLUTION EPIDURAL; INFILTRATION; INTRACAUDAL; PERINEURAL AS NEEDED
Status: DISCONTINUED | OUTPATIENT
Start: 2022-10-25 | End: 2022-10-25

## 2022-10-25 RX ADMIN — SODIUM CHLORIDE, SODIUM LACTATE, POTASSIUM CHLORIDE, AND CALCIUM CHLORIDE 125 ML/HR: .6; .31; .03; .02 INJECTION, SOLUTION INTRAVENOUS at 10:42

## 2022-10-25 RX ADMIN — PROPOFOL 100 MG: 10 INJECTION, EMULSION INTRAVENOUS at 11:08

## 2022-10-25 RX ADMIN — PROPOFOL 50 MG: 10 INJECTION, EMULSION INTRAVENOUS at 11:10

## 2022-10-25 RX ADMIN — GLYCOPYRROLATE 0.2 MG: 0.2 INJECTION, SOLUTION INTRAMUSCULAR; INTRAVENOUS at 11:05

## 2022-10-25 RX ADMIN — LIDOCAINE HYDROCHLORIDE 50 MG: 20 INJECTION, SOLUTION EPIDURAL; INFILTRATION; INTRACAUDAL; PERINEURAL at 11:08

## 2022-10-25 RX ADMIN — PROPOFOL 50 MG: 10 INJECTION, EMULSION INTRAVENOUS at 11:12

## 2022-10-25 NOTE — DISCHARGE INSTRUCTIONS
Upper Endoscopy   WHAT YOU NEED TO KNOW:   An upper endoscopy is also called an upper gastrointestinal (GI) endoscopy, or an esophagogastroduodenoscopy (EGD)  It is a procedure to examine the inside of your esophagus, stomach, and duodenum (first part of the small intestine) with a scope  You may feel bloated, gassy, or have some abdominal discomfort after your procedure  Your throat may be sore for 24 to 36 hours  You may burp or pass gas from air that is still inside your body  DISCHARGE INSTRUCTIONS:   Seek care immediately if:   You have sudden, severe abdominal pain  You have problems swallowing  You have a large amount of black, sticky bowel movements or blood in your bowel movements  You have sudden trouble breathing  You feel weak, lightheaded, or faint or your heart beats faster than normal for you  Contact your healthcare provider if:   You have a fever and chills  You have nausea or are vomiting  Your abdomen is bloated or feels full and hard  You have abdominal pain  You have black, sticky bowel movements or blood in your bowel movements  You have not had a bowel movement for 3 days after your procedure  You have rash or hives  You have questions or concerns about your procedure  Activity:   Do not lift, strain, or run for 24 hours after your procedure  Rest after your procedure  You have been given medicine to relax you  Do not drive or make important decisions until the day after your procedure  Return to your normal activity as directed  Relieve gas and discomfort from bloating by lying on your right side with a heating pad on your abdomen  You may need to take short walks to help the gas move out  Eat small meals until bloating is relieved  Follow up with your healthcare provider as directed: Write down your questions so you remember to ask them during your visits       If you take a “blood thinner”, please review the specific instructions from your endoscopist about when you should resume it  These can be found in the “Recommendation” and “Your Medication list” sections of this After Visit Summary

## 2022-10-25 NOTE — ANESTHESIA PREPROCEDURE EVALUATION
Procedure:  EGD    Relevant Problems   ANESTHESIA (within normal limits)      CARDIO   (+) Hypertriglyceridemia      ENDO   (-) Diabetes mellitus type 1 (HCC)   (-) Diabetes mellitus, type 2 (HCC)   (-) Hyperthyroidism   (-) Hypothyroidism      GI/HEPATIC   (+) PUD (peptic ulcer disease)      /RENAL (within normal limits)      HEMATOLOGY   (-) Coagulation disorder (HCC)   (-) Hypercoagulable state (HCC)      MUSCULOSKELETAL   (+) Back pain   (+) Osteoarthritis of lumbar spine      NEURO/PSYCH   (-) Anxiety   (-) CVA (cerebral vascular accident) ([address] )   (-) Depression   (-) Headache   (-) Seizures (HCC)      PULMONARY   (+) Mild persistent asthma without complication (inhaler used this AM with IV placement)   (+) DIPTI (obstructive sleep apnea) (cpap compliant)   (-) Pneumonia   (-) Shortness of breath   (-) Smoking   (-) URI (upper respiratory infection)       STRESS TEST (2018): Tomographic gated adenosine myocardial perfusion scan 30 minutes following   intravenous administration of 11 03 mCi of Tc-99m MIBI at rest and 6 mCi of Tc   99m MIBI 30 minutes after the intravenous injection of 0 4 mg of regadenoson  A   nondiagnostic CT scan of the lower thorax was obtained for attenuation   correction  There is mild fixed decreased labeling overlying the anterior wall on the non   attenuated corrected tomographic images which improves on the attenuated   corrected tomographic images and which moves normally on the gated portion of   the study most consistent with breast attenuation in this region  No other fixed   or reversible perfusion defects are demonstrated  Left ventricle is normal in   size  There is normal wall motion  The calculated left ventricular ejection   fraction is 65% which is above the normal lower limit of 50%       IMPRESSION:   Impression: Essentially normal study      Physical Exam    Airway    Mallampati score: III  TM Distance: >3 FB  Neck ROM: full     Dental   Comment: No loose , Cardiovascular  Rhythm: regular, Rate: normal,     Pulmonary  Breath sounds clear to auscultation,     Other Findings        Anesthesia Plan  ASA Score- 2     Anesthesia Type- IV sedation with anesthesia with ASA Monitors  Additional Monitors:   Airway Plan:           Plan Factors-Exercise tolerance (METS): >4 METS  Chart reviewed  Patient summary reviewed  Patient is not a current smoker  Obstructive sleep apnea risk education given perioperatively  Induction- intravenous  Postoperative Plan-     Informed Consent- Anesthetic plan and risks discussed with patient  I personally reviewed this patient with the CRNA  Discussed and agreed on the Anesthesia Plan with the CRNA  Kathryn Juarez

## 2022-10-25 NOTE — H&P
History and Physical -  [facility]  Rohan Castano 74 y o  female MRN: [id number]                  HPI: Rohan Castano is a 74y o  year old female who presents for EGD for evaluation of pre-cursor to Cody's esophagus      REVIEW OF SYSTEMS: Per the HPI, and otherwise unremarkable  Historical Information   Past Medical History:   Diagnosis Date   • Asthma    • Cancer ([address] )     skin   • Chronic pain disorder     back   • Colon polyp    • CPAP (continuous positive airway pressure) dependence    • GERD (gastroesophageal reflux disease)    • Hearing loss of right ear    • History of ear surgery     14ears old   • Obesity    • Skin cancer of chest, excluding breast    • Sleep apnea     wears cpap     Past Surgical History:   Procedure Laterality Date   • BLADDER SURGERY      sling   •  SECTION     • EGD AND COLONOSCOPY N/A 2017    Procedure: EGD with bx AND COLONOSCOPY polypectomy x3;  Surgeon: Adolfo Bangura MD;  Location: [facility];   Service:    • HAND SURGERY       Social History   Social History     Substance and Sexual Activity   Alcohol Use No     Social History     Substance and Sexual Activity   Drug Use No     Social History     Tobacco Use   Smoking Status Never Smoker   Smokeless Tobacco Never Used     Family History   Problem Relation Age of Onset   • No Known Problems Mother    • Cancer Father    • No Known Problems Sister    • Stomach cancer Maternal Grandmother    • No Known Problems Maternal Grandfather    • No Known Problems Paternal Grandmother    • No Known Problems Paternal Grandfather    • No Known Problems Paternal Aunt        Meds/Allergies       Current Outpatient Medications:   •  diphenhydrAMINE (BENADRYL) 25 mg tablet  •  esomeprazole (NexIUM) 20 mg capsule  •  fluticasone-salmeterol (ADVAIR) 100-50 mcg/dose  •  levalbuterol (XOPENEX) 0 31 mg/3 mL nebulizer solution    Current Facility-Administered Medications:   •  lactated ringers infusion, 125 mL/hr, Intravenous, Continuous, Continue from Pre-op at 10/25/22 1047    Allergies   Allergen Reactions   • Shellfish-Derived Products - Food Allergy Anaphylaxis   • Eggs Or Egg-Derived Products - Food Allergy GI Intolerance   • Fruit C [Ascorbate - Food Allergy]      strawberries  strawberries   • Omeprazole      Other reaction(s): nausea   • Acetazolamide Rash   • Sulfa Antibiotics Rash       Objective     /75   Pulse 75   Temp (!) 97 2 °F (36 2 °C) (Temporal)   Resp 18   SpO2 98%       PHYSICAL EXAM    Gen: NAD  Head: NCAT  CV: RRR  CHEST: Clear  ABD: soft, NT/ND  EXT: no edema      ASSESSMENT/PLAN:  This is a 74y o  year old female here for EGD, and she is stable and optimized for her procedure

## 2022-10-25 NOTE — ANESTHESIA POSTPROCEDURE EVALUATION
Post-Op Assessment Note    CV Status:  Stable  Pain Score: 0    Pain management: adequate     Mental Status:  Arousable   Hydration Status:  Stable   PONV Controlled:  None   Airway Patency:  Patent      Post Op Vitals Reviewed: Yes      Staff: CRNA         No complications documented      BP   118/64   Temp     Pulse  90   Resp   20   SpO2   98%

## 2022-10-31 DIAGNOSIS — K21.9 GASTROESOPHAGEAL REFLUX DISEASE WITHOUT ESOPHAGITIS: Primary | ICD-10-CM

## 2022-10-31 RX ORDER — PANTOPRAZOLE SODIUM 40 MG/1
40 TABLET, DELAYED RELEASE ORAL DAILY
Qty: 90 TABLET | Refills: 2 | Status: SHIPPED | OUTPATIENT
Start: 2022-10-31 | End: 2023-01-29

## 2022-11-15 ENCOUNTER — ANNUAL EXAM (OUTPATIENT)
Dept: OBGYN CLINIC | Facility: MEDICAL CENTER | Age: 67
End: 2022-11-15

## 2022-11-15 VITALS
WEIGHT: 182 LBS | DIASTOLIC BLOOD PRESSURE: 80 MMHG | SYSTOLIC BLOOD PRESSURE: 128 MMHG | BODY MASS INDEX: 33.49 KG/M2 | HEIGHT: 62 IN

## 2022-11-15 DIAGNOSIS — Z78.0 ENCOUNTER FOR OSTEOPOROSIS SCREENING IN ASYMPTOMATIC POSTMENOPAUSAL PATIENT: ICD-10-CM

## 2022-11-15 DIAGNOSIS — Z13.820 ENCOUNTER FOR OSTEOPOROSIS SCREENING IN ASYMPTOMATIC POSTMENOPAUSAL PATIENT: ICD-10-CM

## 2022-11-15 DIAGNOSIS — Z12.31 ENCOUNTER FOR SCREENING MAMMOGRAM FOR MALIGNANT NEOPLASM OF BREAST: ICD-10-CM

## 2022-11-15 DIAGNOSIS — Z01.419 ENCOUNTER FOR WELL WOMAN EXAM WITH ROUTINE GYNECOLOGICAL EXAM: Primary | ICD-10-CM

## 2022-11-15 NOTE — PROGRESS NOTES
ASSESSMENT & PLAN: Yeison Adamson is a 74 y o   with normal gynecologic exam     1   Routine well woman exam done today  2  Pap and HPV:  The patient's Pap with reflex cotesting was done today  Current ASCCP Guidelines reviewed  Every 2 years  3  Mammogram ordered  4  Colonoscopy last done 17  4  The following were reviewed in today's visit: breast self exam    CC:  Annual Gynecologic Examination    HPI: Yeison Adamson is a 74 y o  Katelin Dura who presents for annual gynecologic examination  She has the following concerns:  None  Tried Gabapentin last year but did not tolerate side effects    Health Maintenance:    She wears her seatbelt routinely  She does perform regular monthly self breast exams  She feels safe at home  Pap:   Last mammogram:   Last colonoscopy:     Past Medical History:   Diagnosis Date   • Asthma    • Cancer ([address] )     skin   • Chronic pain disorder     back   • Colon polyp    • CPAP (continuous positive airway pressure) dependence    • GERD (gastroesophageal reflux disease)    • Hearing loss of right ear    • History of ear surgery     14ears old   • Obesity    • Skin cancer of chest, excluding breast    • Sleep apnea     wears cpap       Past Surgical History:   Procedure Laterality Date   • BLADDER SURGERY      sling   •  SECTION     • EGD AND COLONOSCOPY N/A 2017    Procedure: EGD with bx AND COLONOSCOPY polypectomy x3;  Surgeon: Mildred Canales MD;  Location: [facility];   Service:    • HAND SURGERY         Past OB/Gyn History:  OB History        5    Para   3    Term   2       1    AB   2    Living   2       SAB   2    IAB        Ectopic        Multiple        Live Births   2                   History of sexually transmitted infection No  History of abnormal pap smears  No     Family History   Problem Relation Age of Onset   • No Known Problems Mother    • Cancer Father    • No Known Problems Sister    • Stomach cancer Maternal Grandmother    • No Known Problems Maternal Grandfather    • No Known Problems Paternal Grandmother    • No Known Problems Paternal Grandfather    • No Known Problems Paternal Aunt        Social History:  Social History     Socioeconomic History   • Marital status: /Civil Union     Spouse name: Not on file   • Number of children: Not on file   • Years of education: Not on file   • Highest education level: Not on file   Occupational History   • Not on file   Tobacco Use   • Smoking status: Never Smoker   • Smokeless tobacco: Never Used   Vaping Use   • Vaping Use: Never used   Substance and Sexual Activity   • Alcohol use: No   • Drug use: No   • Sexual activity: Not Currently     Birth control/protection: Post-menopausal   Other Topics Concern   • Not on file   Social History Narrative   • Not on file     Social Determinants of Health     Financial Resource Strain: Not on file   Food Insecurity: Not on file   Transportation Needs: Not on file   Physical Activity: Not on file   Stress: Not on file   Social Connections: Not on file   Intimate Partner Violence: Not on file   Housing Stability: Not on file       Allergies   Allergen Reactions   • Shellfish-Derived Products - Food Allergy Anaphylaxis   • Eggs Or Egg-Derived Products - Food Allergy GI Intolerance   • Fruit C [Ascorbate - Food Allergy]      strawberries  strawberries   • Omeprazole      Other reaction(s): nausea   • Acetazolamide Rash   • Sulfa Antibiotics Rash       Current Outpatient Medications:   •  diphenhydrAMINE (BENADRYL) 25 mg tablet, Take 25 mg by mouth every 6 (six) hours as needed for itching, Disp: , Rfl:   •  esomeprazole (NexIUM) 20 mg capsule, Take 2 capsules (40 mg total) by mouth daily in the early morning, Disp: 180 capsule, Rfl: 3  •  fluticasone-salmeterol (ADVAIR) 100-50 mcg/dose, Inhale 1 puff every 12 (twelve) hours, Disp: , Rfl:   •  levalbuterol (XOPENEX) 0 31 mg/3 mL nebulizer solution, Take 1 ampule by nebulization every 4 (four) hours as needed for wheezing, Disp: , Rfl:   •  pantoprazole (PROTONIX) 40 mg tablet, Take 1 tablet (40 mg total) by mouth daily, Disp: 90 tablet, Rfl: 2      Review of Systems  Constitutional :no fever, feels well, no tiredness, no recent weight gain or loss  ENT: no ear ache, no loss of hearing, no nosebleeds or nasal discharge, no sore throat or hoarseness  Cardiovascular: no complaints of slow or fast heart beat, no chest pain, no palpitations, no leg claudication or lower extremity edema  Respiratory: no complaints of shortness of shortness of breath, no REGALADO  Breasts:no complaints of breast pain, breast lump, or nipple discharge  Gastrointestinal: no complaints of abdominal pain, constipation,nausea, vomiting, or diarrhea or bloody stools  Genitourinary : no complaints of dysuria, incontinence, pelvic pain, no dysmenorrhea, vaginal discharge or abnormal vaginal bleeding and as noted in HPI    Integumentary: no complaints of skin rash or lesion, itching or dry skin  Neurological: no complaints of headache, no confusion, no numbness or tingling, no dizziness or fainting    Objective      /80 (BP Location: Right arm, Patient Position: Sitting, Cuff Size: Standard)   Ht 5' 2" (1 575 m)   Wt 82 6 kg (182 lb)   LMP  (LMP Unknown)   BMI 33 29 kg/m²     General:   appears stated age, cooperative, alert normal mood and affect   Neck: Neck: normal, supple,trachea midline, no masses   Heart: regular rate and rhythm, S1, S2 normal, no murmur, click, rub or gallop   Lungs: clear to auscultation bilaterally   Breasts: normal appearance, no masses or tenderness, No nipple retraction or dimpling, No nipple discharge or bleeding   Abdomen: soft, non-tender, without masses or organomegaly   Vulva: normal female genitalia, Bartholin's, Urethra, [place] normal, no lesions   Vagina: normal vagina, no discharge, exudate, lesion, or erythema   Urethra: normal   Cervix: Normal, no discharge   Uterus: normal size, contour, position, consistency, mobility, non-tender   Adnexa: normal adnexa

## 2022-11-16 LAB
HPV HR 12 DNA CVX QL NAA+PROBE: NEGATIVE
HPV16 DNA CVX QL NAA+PROBE: NEGATIVE
HPV18 DNA CVX QL NAA+PROBE: NEGATIVE

## 2022-11-21 LAB
LAB AP GYN PRIMARY INTERPRETATION: NORMAL
Lab: NORMAL

## 2022-11-30 RX ORDER — OMEPRAZOLE 40 MG/1
40 CAPSULE, DELAYED RELEASE ORAL DAILY
Qty: 1 CAPSULE | Refills: 0 | Status: SHIPPED | OUTPATIENT
Start: 2022-11-30

## 2023-01-25 ENCOUNTER — HOSPITAL ENCOUNTER (OUTPATIENT)
Dept: BONE DENSITY | Facility: MEDICAL CENTER | Age: 68
Discharge: HOME | End: 2023-01-25

## 2023-01-25 ENCOUNTER — HOSPITAL ENCOUNTER (OUTPATIENT)
Dept: MAMMOGRAPHY | Facility: MEDICAL CENTER | Age: 68
Discharge: HOME | End: 2023-01-25

## 2023-01-25 VITALS — HEIGHT: 62 IN | BODY MASS INDEX: 33.51 KG/M2 | WEIGHT: 182.1 LBS

## 2023-01-25 DIAGNOSIS — Z13.820 ENCOUNTER FOR OSTEOPOROSIS SCREENING IN ASYMPTOMATIC POSTMENOPAUSAL PATIENT: ICD-10-CM

## 2023-01-25 DIAGNOSIS — Z78.0 ENCOUNTER FOR OSTEOPOROSIS SCREENING IN ASYMPTOMATIC POSTMENOPAUSAL PATIENT: ICD-10-CM

## 2023-01-25 DIAGNOSIS — Z12.31 ENCOUNTER FOR SCREENING MAMMOGRAM FOR MALIGNANT NEOPLASM OF BREAST: ICD-10-CM

## 2023-06-26 ENCOUNTER — APPOINTMENT (OUTPATIENT)
Dept: RADIOLOGY | Facility: CLINIC | Age: 68
End: 2023-06-26
Payer: COMMERCIAL

## 2023-06-26 ENCOUNTER — OFFICE VISIT (OUTPATIENT)
Dept: OBGYN CLINIC | Facility: CLINIC | Age: 68
End: 2023-06-26
Payer: COMMERCIAL

## 2023-06-26 VITALS
WEIGHT: 187 LBS | DIASTOLIC BLOOD PRESSURE: 84 MMHG | HEIGHT: 62 IN | BODY MASS INDEX: 34.41 KG/M2 | SYSTOLIC BLOOD PRESSURE: 134 MMHG

## 2023-06-26 DIAGNOSIS — M79.641 RIGHT HAND PAIN: ICD-10-CM

## 2023-06-26 DIAGNOSIS — M15.8 DEGENERATIVE ARTHRITIS OF INTERPHALANGEAL JOINT OF RIGHT THUMB: ICD-10-CM

## 2023-06-26 DIAGNOSIS — M79.642 LEFT HAND PAIN: ICD-10-CM

## 2023-06-26 DIAGNOSIS — M65.342 TRIGGER RING FINGER OF LEFT HAND: Primary | ICD-10-CM

## 2023-06-26 PROCEDURE — 99203 OFFICE O/P NEW LOW 30 MIN: CPT | Performed by: ORTHOPAEDIC SURGERY

## 2023-06-26 PROCEDURE — 20550 NJX 1 TENDON SHEATH/LIGAMENT: CPT | Performed by: ORTHOPAEDIC SURGERY

## 2023-06-26 PROCEDURE — 73130 X-RAY EXAM OF HAND: CPT

## 2023-06-26 RX ORDER — METAXALONE 800 MG/1
800 TABLET ORAL 3 TIMES DAILY PRN
COMMUNITY
Start: 2023-01-10 | End: 2024-01-10

## 2023-06-26 RX ORDER — LIDOCAINE HYDROCHLORIDE 10 MG/ML
1 INJECTION, SOLUTION INFILTRATION; PERINEURAL
Status: COMPLETED | OUTPATIENT
Start: 2023-06-26 | End: 2023-06-26

## 2023-06-26 RX ORDER — BETAMETHASONE SODIUM PHOSPHATE AND BETAMETHASONE ACETATE 3; 3 MG/ML; MG/ML
6 INJECTION, SUSPENSION INTRA-ARTICULAR; INTRALESIONAL; INTRAMUSCULAR; SOFT TISSUE
Status: COMPLETED | OUTPATIENT
Start: 2023-06-26 | End: 2023-06-26

## 2023-06-26 RX ORDER — ALBUTEROL SULFATE 90 UG/1
AEROSOL, METERED RESPIRATORY (INHALATION)
COMMUNITY
Start: 2023-03-20

## 2023-06-26 RX ADMIN — LIDOCAINE HYDROCHLORIDE 1 ML: 10 INJECTION, SOLUTION INFILTRATION; PERINEURAL at 12:15

## 2023-06-26 RX ADMIN — BETAMETHASONE SODIUM PHOSPHATE AND BETAMETHASONE ACETATE 6 MG: 3; 3 INJECTION, SUSPENSION INTRA-ARTICULAR; INTRALESIONAL; INTRAMUSCULAR; SOFT TISSUE at 12:15

## 2023-06-26 NOTE — PROGRESS NOTES
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     Plan:   She is not interested in surgical intervention regarding the right thumb IP joint OA, she was provided with a Silipos finger sleeve for comfort  The decision was made to proceed with a left ring trigger finger CSI, the CSI was performed in the office without complication  Post injection protocol/expectations were reviewed  If the CSI is beneficial, it can be repeated in 6 weeks time  Follow up in 6 weeks time  Follow Up:  6  week(s)    To Do Next Visit:  Re-evaluation     General Discussions:  Osteoarthritis:  The anatomy and physiology of osteoarthritis was discussed with the patient today in the office  Deterioration of the articular cartilage eventually leads to altered mobility at the joint, resulting in joint subluxation, osteophyte formation, cystic changes, as well as subchondral sclerosis  Eventually, pain, limited mobility, and compensatory hypermobility at surrounding joints may develop  While normal activity and usage of the joint may provide a painful experience to the patient, this typically does not result in damage to the limb  Treatment options include splints to decreased joint edema, pain, and inflammation  Therapy exercises to strengthen the surrounding musculature may relieve pain, but do not alter the overall continued development of osteoarthritis  Oral medications, topical medications, corticosteroid injections may decrease pain and increase overall function  Eventually, some patients may require surgical intervention  Trigger FInger: The anatomy and physiology of trigger finger was discussed with the patient today in the office  Edema and increased contact pressure within the flexor tendons at the A1 pulley can cause pain, crepitation, and limitation of function    Treatment options include resting MP blocking splints to decrease edema, oral anti-inflammatory medications, home or formal therapy exercises, up to 2 steroid injections within the tendon sheath, or surgical release  While majority of patients do respond to conservative treatment, up to 20% may require surgical release          _____________________________________________________  CHIEF COMPLAINT:  Chief Complaint   Patient presents with   • Left Hand - Pain   • Right Hand - Pain     thumbs         SUBJECTIVE:  Armida Chavira is a 79 y o  female who presents with bilateral hand pain  She notes pain to her right thumb IP joint  Pain is to the ulnar aspect of the joint  She notes this started after crocheting a lot during Covid and now occurs with grasping objects  She also notes pain with left small and ring finger flexion  She denies any clicking, catching and locking  She feels this has been ongoing for years  She has a history of bilateral carpal tunnel releases as well as bilateral thumb CMC arthroplasties, most recent surgery was right thumb CMC arthroplasty, which was aprox  2 years ago  The other surgeries were performed aprox  15-20 years ago  She is not currently taking anything for pain control      Radiation: None  Previous Treatments: None   Associated symptoms: Stiffness/LROM  Handedness: right      PAST MEDICAL HISTORY:  Past Medical History:   Diagnosis Date   • Asthma    • Cancer ([address] )     skin   • Chronic pain disorder     back   • Colon polyp    • CPAP (continuous positive airway pressure) dependence    • GERD (gastroesophageal reflux disease)    • Hearing loss of right ear    • History of ear surgery     14ears old   • Obesity    • Skin cancer of chest, excluding breast    • Sleep apnea     wears cpap       PAST SURGICAL HISTORY:  Past Surgical History:   Procedure Laterality Date   • BLADDER SURGERY      sling   •  SECTION     • EGD AND COLONOSCOPY N/A 2017    Procedure: EGD with bx AND COLONOSCOPY polypectomy x3;  Surgeon: Dev Sloan Reina Cardoza MD;  Location: [facility]; Service:    • HAND SURGERY         FAMILY HISTORY:  Family History   Problem Relation Age of Onset   • No Known Problems Mother    • Cancer Father 76        throat? • No Known Problems Sister    • Stomach cancer Maternal Grandmother 67   • No Known Problems Maternal Grandfather    • No Known Problems Paternal Grandmother    • No Known Problems Paternal Grandfather    • No Known Problems Paternal Aunt        SOCIAL HISTORY:  Social History     Tobacco Use   • Smoking status: Never   • Smokeless tobacco: Never   Vaping Use   • Vaping Use: Never used   Substance Use Topics   • Alcohol use: No   • Drug use: No       MEDICATIONS:    Current Outpatient Medications:   •  metaxalone (SKELAXIN) 800 mg tablet, Take 800 mg by mouth Three times daily as needed, Disp: , Rfl:   •  omeprazole (PriLOSEC) 40 MG capsule, Take 1 capsule (40 mg total) by mouth daily, Disp: 1 capsule, Rfl: 0  •  albuterol (PROVENTIL HFA,VENTOLIN HFA) 90 mcg/act inhaler, INHALE 2 PUFFS EVERY 6 HOURS AS NEEDED FOR WHEEZING, Disp: , Rfl:   •  diphenhydrAMINE (BENADRYL) 25 mg tablet, Take 25 mg by mouth every 6 (six) hours as needed for itching, Disp: , Rfl:   •  fluticasone-salmeterol (ADVAIR) 100-50 mcg/dose, Inhale 1 puff every 12 (twelve) hours, Disp: , Rfl:   •  levalbuterol (XOPENEX) 0 31 mg/3 mL nebulizer solution, Take 1 ampule by nebulization every 4 (four) hours as needed for wheezing, Disp: , Rfl:   •  pantoprazole (PROTONIX) 40 mg tablet, Take 1 tablet (40 mg total) by mouth daily, Disp: 90 tablet, Rfl: 2    ALLERGIES:  Allergies   Allergen Reactions   • Shellfish-Derived Products - Food Allergy Anaphylaxis   • Eggs Or Egg-Derived Products - Food Allergy GI Intolerance   • Fruit C [Ascorbate - Food Allergy]      strawberries  strawberries   • Omeprazole      Other reaction(s): nausea   • Acetazolamide Rash   • Sulfa Antibiotics Rash       REVIEW OF SYSTEMS:  Pertinent items are noted in HPI    A "comprehensive review of systems was negative  LABS:  HgA1c:   Lab Results   Component Value Date    HGBA1C 5 8 (H) 11/16/2021     BMP: No results found for: \"GLUCOSE\", \"CALCIUM\", \"NA\", \"K\", \"CO2\", \"CL\", \"BUN\", \"CREATININE\"      _____________________________________________________  PHYSICAL EXAMINATION:  Vital signs: /84   Ht 5' 2\" (1 575 m)   Wt 84 8 kg (187 lb)   LMP  (LMP Unknown)   BMI 34 20 kg/m²   General: well developed and well nourished, alert, oriented times 3 and appears comfortable  Psychiatric: Normal  HEENT: Trachea Midline, No torticollis  Cardiovascular: No discernable arrhythmia  Pulmonary: No wheezing or stridor  Abdomen: No rebound or guarding  Extremities: No peripheral edema  Skin: No masses, erythema, lacerations, fluctation, ulcerations  Neurovascular: Sensation Intact to the Median, Ulnar, Radial Nerve, Motor Intact to the Median, Ulnar, Radial Nerve and Pulses Intact    MUSCULOSKELETAL EXAMINATION:    LEFT SIDE:  Finger: No erythema, ecchymosis or significant edema, tender to palpation A1 pulley of the ring finger    RIGHT SIDE: Thumb: No erythema, ecchymosis or edema, palpable bone spur ulnar aspect of the IP joint, - tinel's over digital nerve of the thumb, full composite fist     _____________________________________________________  STUDIES REVIEWED:  Images were reviewed in PACS by Dr Khalida Marmolejo and demonstrate: no acute fracture or dislocation  Degenerative changes noted  History of bilateral thumb CMC arthroplasties  PROCEDURES PERFORMED:  Hand/upper extremity injection: L ring A1  Universal Protocol:  Consent: Verbal consent obtained  Written consent not obtained  Risks and benefits: risks, benefits and alternatives were discussed  Consent given by: patient  Time out: Immediately prior to procedure a \"time out\" was called to verify the correct patient, procedure, equipment, support staff and site/side marked as required    Patient understanding: patient states " understanding of the procedure being performed  Site marked: the operative site was marked  Patient identity confirmed: verbally with patient    Supporting Documentation  Indications: pain   Procedure Details  Condition:trigger finger Location: ring finger - L ring A1   Preparation: Patient was prepped and draped in the usual sterile fashion  Needle size: 25 G  Ultrasound guidance: no  Medications administered: 1 mL lidocaine 1 %; 6 mg betamethasone acetate-betamethasone sodium phosphate 6 (3-3) mg/mL    Patient tolerance: patient tolerated the procedure well with no immediate complications  Dressing:  Sterile dressing applied              Scribe Attestation    I,:  Claude Rump Ditmars am acting as a scribe while in the presence of the attending physician :       I,:  Rik Luke MD personally performed the services described in this documentation    as scribed in my presence :

## 2023-08-07 ENCOUNTER — OFFICE VISIT (OUTPATIENT)
Dept: OBGYN CLINIC | Facility: CLINIC | Age: 68
End: 2023-08-07
Payer: COMMERCIAL

## 2023-08-07 VITALS
DIASTOLIC BLOOD PRESSURE: 80 MMHG | SYSTOLIC BLOOD PRESSURE: 110 MMHG | WEIGHT: 181 LBS | BODY MASS INDEX: 33.31 KG/M2 | HEIGHT: 62 IN

## 2023-08-07 DIAGNOSIS — M65.342 TRIGGER RING FINGER OF LEFT HAND: Primary | ICD-10-CM

## 2023-08-07 PROCEDURE — 99214 OFFICE O/P EST MOD 30 MIN: CPT | Performed by: PHYSICIAN ASSISTANT

## 2023-08-07 PROCEDURE — 20550 NJX 1 TENDON SHEATH/LIGAMENT: CPT | Performed by: PHYSICIAN ASSISTANT

## 2023-08-07 RX ORDER — LIDOCAINE HYDROCHLORIDE 10 MG/ML
1 INJECTION, SOLUTION INFILTRATION; PERINEURAL
Status: COMPLETED | OUTPATIENT
Start: 2023-08-07 | End: 2023-08-07

## 2023-08-07 RX ORDER — BETAMETHASONE SODIUM PHOSPHATE AND BETAMETHASONE ACETATE 3; 3 MG/ML; MG/ML
6 INJECTION, SUSPENSION INTRA-ARTICULAR; INTRALESIONAL; INTRAMUSCULAR; SOFT TISSUE
Status: COMPLETED | OUTPATIENT
Start: 2023-08-07 | End: 2023-08-07

## 2023-08-07 RX ADMIN — LIDOCAINE HYDROCHLORIDE 1 ML: 10 INJECTION, SOLUTION INFILTRATION; PERINEURAL at 12:00

## 2023-08-07 RX ADMIN — BETAMETHASONE SODIUM PHOSPHATE AND BETAMETHASONE ACETATE 6 MG: 3; 3 INJECTION, SUSPENSION INTRA-ARTICULAR; INTRALESIONAL; INTRAMUSCULAR; SOFT TISSUE at 12:00

## 2023-08-07 NOTE — PROGRESS NOTES
ASSESSMENT/PLAN:    Assessment:   Left ring trigger finger    Plan:   Patient was given her second left ring trigger finger cortisone injection today. She tolerated well. She was advised that this is the second injection which if it were to return we would discuss surgical intervention  She will follow-up in 8 weeks for reevaluation of her symptoms    Follow Up:  8 weeks    To Do Next Visit:  Evaluation    General Discussions:  Trigger FInger: The anatomy and physiology of trigger finger was discussed with the patient today in the office. Edema and increased contact pressure within the flexor tendons at the A1 pulley can cause pain, crepitation, and limitation of function. Treatment options include resting MP blocking splints to decrease edema, oral anti-inflammatory medications, home or formal therapy exercises, up to 2 steroid injections within the tendon sheath, or surgical release. While majority of patients do respond to conservative treatment, up to 20% may require surgical release.         _____________________________________________________  CHIEF COMPLAINT:  Chief Complaint   Patient presents with   • Left Ring Finger - Follow-up, Triggering     #1 injection 6/26/23    • Right Thumb - Follow-up         SUBJECTIVE:  Dianna Maddox is a 79 y.o. female who presents for follow-up regarding left ring trigger finger. He states that she will still have some stiffness in the digit and she also notes some pain and discomfort. She states it is not as bad as what it was prior to her first injection. She states that she would like to try repeat injection for this issue.     PAST MEDICAL HISTORY:  Past Medical History:   Diagnosis Date   • Asthma    • Cancer ([address])     skin   • Chronic pain disorder     back   • Colon polyp    • CPAP (continuous positive airway pressure) dependence    • GERD (gastroesophageal reflux disease)    • Hearing loss of right ear    • History of ear surgery     14ears old   • Obesity • Skin cancer of chest, excluding breast    • Sleep apnea     wears cpap       PAST SURGICAL HISTORY:  Past Surgical History:   Procedure Laterality Date   • BLADDER SURGERY      sling   •  SECTION     • EGD AND COLONOSCOPY N/A 2017    Procedure: EGD with bx AND COLONOSCOPY polypectomy x3;  Surgeon: Sharan Hannah MD;  Location: [facility]; Service:    • HAND SURGERY         FAMILY HISTORY:  Family History   Problem Relation Age of Onset   • No Known Problems Mother    • Cancer Father 76        throat?    • No Known Problems Sister    • Stomach cancer Maternal Grandmother 67   • No Known Problems Maternal Grandfather    • No Known Problems Paternal Grandmother    • No Known Problems Paternal Grandfather    • No Known Problems Paternal Aunt        SOCIAL HISTORY:  Social History     Tobacco Use   • Smoking status: Never   • Smokeless tobacco: Never   Vaping Use   • Vaping Use: Never used   Substance Use Topics   • Alcohol use: No   • Drug use: No       MEDICATIONS:    Current Outpatient Medications:   •  omeprazole (PriLOSEC) 40 MG capsule, Take 1 capsule (40 mg total) by mouth daily, Disp: 1 capsule, Rfl: 0  •  albuterol (PROVENTIL HFA,VENTOLIN HFA) 90 mcg/act inhaler, INHALE 2 PUFFS EVERY 6 HOURS AS NEEDED FOR WHEEZING, Disp: , Rfl:   •  diphenhydrAMINE (BENADRYL) 25 mg tablet, Take 25 mg by mouth every 6 (six) hours as needed for itching, Disp: , Rfl:   •  fluticasone-salmeterol (ADVAIR) 100-50 mcg/dose, Inhale 1 puff every 12 (twelve) hours, Disp: , Rfl:   •  levalbuterol (XOPENEX) 0.31 mg/3 mL nebulizer solution, Take 1 ampule by nebulization every 4 (four) hours as needed for wheezing, Disp: , Rfl:   •  metaxalone (SKELAXIN) 800 mg tablet, Take 800 mg by mouth Three times daily as needed, Disp: , Rfl:   •  pantoprazole (PROTONIX) 40 mg tablet, Take 1 tablet (40 mg total) by mouth daily, Disp: 90 tablet, Rfl: 2    ALLERGIES:  Allergies   Allergen Reactions   • Shellfish-Derived Products - Food Allergy Anaphylaxis   • Eggs Or Egg-Derived Products - Food Allergy GI Intolerance   • Fruit C [Ascorbate - Food Allergy]      strawberries  strawberries   • Omeprazole      Other reaction(s): nausea   • Acetazolamide Rash   • Sulfa Antibiotics Rash       REVIEW OF SYSTEMS:  Pertinent items are noted in HPI. A comprehensive review of systems was negative. LABS:  HgA1c:   Lab Results   Component Value Date    HGBA1C 5.8 (H) 11/16/2021     BMP: No results found for: "GLUCOSE", "CALCIUM", "NA", "K", "CO2", "CL", "BUN", "CREATININE"    _____________________________________________________  PHYSICAL EXAMINATION:  Vital signs: /80   Ht 5' 2" (1.575 m)   Wt 82.1 kg (181 lb)   LMP  (LMP Unknown)   BMI 33.11 kg/m²   General: well developed and well nourished, alert, oriented times 3 and appears comfortable  Psychiatric: Normal  HEENT: Trachea Midline, No torticollis  Cardiovascular: No discernable arrhythmia  Pulmonary: No wheezing or stridor  Abdomen: No rebound or guarding  Extremities: No peripheral edema  Skin: No masses, erythema, lacerations, fluctation, ulcerations  Neurovascular: Sensation Intact to the Median, Ulnar, Radial Nerve, Motor Intact to the Median, Ulnar, Radial Nerve and Pulses Intact    MUSCULOSKELETAL EXAMINATION:  left ring finger:  Positive palpable nodule over the A1 pulley. Positive tenderness to palpation over A1 pulley. Negative clicking. Negative catching.       _____________________________________________________  STUDIES REVIEWED:  No Studies to review      PROCEDURES PERFORMED:  Hand/upper extremity injection: L ring A1  Universal Protocol:  Consent: Verbal consent obtained. Risks and benefits: risks, benefits and alternatives were discussed  Consent given by: patient  Time out: Immediately prior to procedure a "time out" was called to verify the correct patient, procedure, equipment, support staff and site/side marked as required.   Patient understanding: patient states understanding of the procedure being performed  Patient consent: the patient's understanding of the procedure matches consent given  Site marked: the operative site was marked  Patient identity confirmed: verbally with patient    Supporting Documentation  Indications: pain   Procedure Details  Condition:trigger finger Location: ring finger - L ring A1   Preparation: Patient was prepped and draped in the usual sterile fashion  Needle size: 25 G  Approach: volar  Medications administered: 1 mL lidocaine 1 %; 6 mg betamethasone acetate-betamethasone sodium phosphate 6 (3-3) mg/mL    Patient tolerance: patient tolerated the procedure well with no immediate complications  Dressing:  Sterile dressing applied

## 2023-09-19 NOTE — MISCELLANEOUS
Message   Recorded as Task   Date: 06/09/2017 09:30 AM, Created By: Scott Marlow   Task Name: Call Back   Assigned To: [facility],Team   Regarding Patient: Carlos Enrique Silva, Status: Active   CommentGeneonelia Gutierres - 09 Jun 2017 9:30 AM     TASK CREATED  Pathology pathology  EGD biopsies were negative for H  pylori and positive for a fundic gland polyp  Sponge the fundic gland polyp is benign, but given that she had a gastric polyp she will need repeat EGD in one year  Colonoscopy pathology with 3 tubular adenomas, due to that she will need repeat colonoscopy in 3 years  Ankita Romero - 09 Jun 2017 9:45 AM     TASK EDITED  Left message for patient to please give our office a call  Ankita Romero - 12 Jun 2017 9:08 AM     TASK EDITED  Patient called back and results were given  Told patient to expect a letter in the mail stating this  Active Problems    1  Back pain (724 5) (M54 9)   2  Colon cancer screening (V76 51) (Z12 11)   3  Encounter for routine gynecological examination with Papanicolaou smear of cervix   (V72 31,V76 2) (Z01 419)   4  Encounter for screening mammogram for malignant neoplasm of breast (V76 12)   (Z12 31)   5  Laboratory examination ordered as part of a routine general medical examination   (V72 62) (Z00 00)   6  Screening for osteoporosis (V82 81) (Z13 820)   7  Symptomatic menopausal or female climacteric states (627 2) (N95 1)    Current Meds   1  Advair Diskus 250-50 MCG/DOSE Inhalation Aerosol Powder Breath Activated; Therapy: (Recorded:[id number]) to Recorded   2  Allegra CAPS; Therapy: (Recorded:[id number]) to Recorded   3  MoviPrep 100 GM Oral Solution Reconstituted; USE AS DIRECTED; Therapy: [id number] to (Last Rx:[id number]) Ordered   4  NexIUM 10 MG Oral Packet; Therapy: (Recorded:[id number]) to Recorded   5  Prempro 0 3-1 5 MG Oral Tablet; Therapy: (Recorded:38Fag7975) to Recorded    Allergies    1   Sulfa Drugs    Signatures   Electronically signed by Physical Therapy Visit    Visit Type: Daily Treatment Note  Visit: 2  Referring Provider: Thong Zavala MD  Medical Diagnosis (from order): Diagnosis Information    Diagnosis  M75.52 (ICD-10-CM) - Subacromial bursitis of left shoulder joint  M25.512 (ICD-10-CM) - Pain in left acromioclavicular joint         SUBJECTIVE                                                                                                               Patient notes she is more sore in the shoulder. She notes now she is feeling pinching slightly more often and after doing HEP.  Is taking meloxicam in the moring      OBJECTIVE                                                                                                                     Range of Motion (ROM)   (degrees unless noted; active unless noted; norms in ( ); negative=lacking to 0, positive=beyond 0)  Shoulder:    - Flexion (180):        • Left:150     - Abduction (180):        • Left: WFL                       Treatment     Therapeutic Exercise  UBE x 5 min (2.5 fwd/2.5 bckwd)     Supine serratus punch; 3#  Wall slides;flexion/scaption x 20 each   RTB Shoulder extension  RTB Rows  RTB ER     Standing corner pec stretch 3 x 30 seconds       Manual Therapy   Gr II GH joint mobs; inferior/posterior     Skilled input: verbal instruction/cues    Writer verbally educated and received verbal consent for hand placement, positioning of patient, and techniques to be performed today from patient for hand placement and palpation for techniques as described above and how they are pertinent to the patient's plan of care.  Home Exercise Program  Access Code: [email or web address]  URL: [email or web address]  Date: 09/19/2023  Prepared by: Thao Martin    Exercises  - Standing Shoulder External Rotation with Resistance  - 1 x daily - 3 sets - 10 reps  - Seated Scapular Retraction  - 1 x daily - 3 sets - 10 reps  - Latissimus Dorsi Stretch at Wall  - 1 x daily - 3 sets - 30 sec.   Harlan Tenorio, ; Jun 12 2017  9:08AM EST                       (Author) hold  - Shoulder External Rotation with Anchored Resistance  - 1 x daily - 4 x weekly - 3 sets - 10 reps  - Shoulder Scaption Wall Slide with Towel  - 1 x daily - 3 sets - 10 reps  - Shoulder Flexion Wall Slide with Towel  - 1 x daily - 3 sets - 10 reps    Patient Education  - Shoulder Impingement      ASSESSMENT                                                                                                            Patient noting some increase in pinching sensation over the last week. Updated HEP today due to increased pain from previous HEP. Continued with strengthening of posterior cuff and periscapular musculature which was tolerated well overall. Will continue to monitor latent response and modify as appropriate. Patient will continue to benefit from skilled PT services to restore pain free mobility, strength and return to daily activities without restriction.      PLAN                                                                                                                           Suggestions for next session as indicated: Progress per plan of care; assess response to HEP       Goals  Long Term Goals: to be met by end of plan of care  1. Patient will improve L. Shoulder ROM to 165 for reaching overhead without restriction   2. Patient will reach behind back without reported pain for completion of self-care tasks such as washing and dressing   3. Patient will lift 10# overhead without shoulder pain for completion of household tasks   4. Quick DASH: Patient will complete form to reflect an improved calculated score to less than or equal to 5 to indicate patient reported improvement in function/disability/impairment. (minimal clinically important difference = 15.91)  5. Patient will be independent with progressed and modified home exercise program.      Therapy procedure time and total treatment time can be found documented on the Time Entry flowsheet

## 2023-10-02 ENCOUNTER — OFFICE VISIT (OUTPATIENT)
Dept: OBGYN CLINIC | Facility: CLINIC | Age: 68
End: 2023-10-02
Payer: COMMERCIAL

## 2023-10-02 VITALS
WEIGHT: 187 LBS | HEIGHT: 62 IN | BODY MASS INDEX: 34.41 KG/M2 | SYSTOLIC BLOOD PRESSURE: 118 MMHG | DIASTOLIC BLOOD PRESSURE: 81 MMHG

## 2023-10-02 DIAGNOSIS — M65.342 TRIGGER RING FINGER OF LEFT HAND: Primary | ICD-10-CM

## 2023-10-02 PROCEDURE — 99213 OFFICE O/P EST LOW 20 MIN: CPT | Performed by: ORTHOPAEDIC SURGERY

## 2023-10-02 NOTE — PROGRESS NOTES
ASSESSMENT/PLAN:    Assessment:   Left ring trigger finger    Plan:   Patient was advised to use heat massage as demonstrated in the office  She was advised that we would proceed with surgical intervention if she were to note increased pain, clicking or locking  She will follow-up with us as needed    Follow Up:  PRN    To Do Next Visit:  Reevaluation    General Discussions:  Trigger FInger: The anatomy and physiology of trigger finger was discussed with the patient today in the office. Edema and increased contact pressure within the flexor tendons at the A1 pulley can cause pain, crepitation, and limitation of function. Treatment options include resting MP blocking splints to decrease edema, oral anti-inflammatory medications, home or formal therapy exercises, up to 2 steroid injections within the tendon sheath, or surgical release. While majority of patients do respond to conservative treatment, up to 20% may require surgical release.         _____________________________________________________  CHIEF COMPLAINT:  Chief Complaint   Patient presents with   • Left Ring Finger - Follow-up, Triggering     TF- 2nd injection 8/7/23   • Right Hand - Pain         SUBJECTIVE:  Robin Beatty is a 79 y.o. female who presents for follow-up regarding left ring trigger finger. She has had 2 cortisone injections for this issue. She states that she does not note much in the way of clicking or locking but will note some discomfort in the palm of her hand. She states overall both of her hands are bothering her but she states it is likely due to osteoarthritis.   She has had multiple surgeries for her hands in the past.    PAST MEDICAL HISTORY:  Past Medical History:   Diagnosis Date   • Asthma    • Cancer ([address])     skin   • Chronic pain disorder     back   • Colon polyp    • CPAP (continuous positive airway pressure) dependence    • GERD (gastroesophageal reflux disease)    • Hearing loss of right ear    • History of ear surgery     14ears old   • Obesity    • Skin cancer of chest, excluding breast    • Sleep apnea     wears cpap       PAST SURGICAL HISTORY:  Past Surgical History:   Procedure Laterality Date   • BLADDER SURGERY      sling   •  SECTION     • EGD AND COLONOSCOPY N/A 2017    Procedure: EGD with bx AND COLONOSCOPY polypectomy x3;  Surgeon: Andrzej Eric MD;  Location: [facility]; Service:    • HAND SURGERY         FAMILY HISTORY:  Family History   Problem Relation Age of Onset   • No Known Problems Mother    • Cancer Father 76        throat?    • No Known Problems Sister    • Stomach cancer Maternal Grandmother 67   • No Known Problems Maternal Grandfather    • No Known Problems Paternal Grandmother    • No Known Problems Paternal Grandfather    • No Known Problems Paternal Aunt        SOCIAL HISTORY:  Social History     Tobacco Use   • Smoking status: Never   • Smokeless tobacco: Never   Vaping Use   • Vaping Use: Never used   Substance Use Topics   • Alcohol use: No   • Drug use: No       MEDICATIONS:    Current Outpatient Medications:   •  omeprazole (PriLOSEC) 40 MG capsule, Take 1 capsule (40 mg total) by mouth daily, Disp: 1 capsule, Rfl: 0  •  albuterol (PROVENTIL HFA,VENTOLIN HFA) 90 mcg/act inhaler, INHALE 2 PUFFS EVERY 6 HOURS AS NEEDED FOR WHEEZING, Disp: , Rfl:   •  diphenhydrAMINE (BENADRYL) 25 mg tablet, Take 25 mg by mouth every 6 (six) hours as needed for itching, Disp: , Rfl:   •  fluticasone-salmeterol (ADVAIR) 100-50 mcg/dose, Inhale 1 puff every 12 (twelve) hours, Disp: , Rfl:   •  levalbuterol (XOPENEX) 0.31 mg/3 mL nebulizer solution, Take 1 ampule by nebulization every 4 (four) hours as needed for wheezing, Disp: , Rfl:   •  metaxalone (SKELAXIN) 800 mg tablet, Take 800 mg by mouth Three times daily as needed, Disp: , Rfl:   •  pantoprazole (PROTONIX) 40 mg tablet, Take 1 tablet (40 mg total) by mouth daily, Disp: 90 tablet, Rfl: 2    ALLERGIES:  Allergies   Allergen Reactions   • Shellfish-Derived Products - Food Allergy Anaphylaxis   • Eggs Or Egg-Derived Products - Food Allergy GI Intolerance   • Fruit C [Ascorbate - Food Allergy]      strawberries  strawberries   • Omeprazole      Other reaction(s): nausea   • Acetazolamide Rash   • Sulfa Antibiotics Rash       REVIEW OF SYSTEMS:  Pertinent items are noted in HPI. A comprehensive review of systems was negative. LABS:  HgA1c:   Lab Results   Component Value Date    HGBA1C 5.8 (H) 11/16/2021     BMP: No results found for: "GLUCOSE", "CALCIUM", "NA", "K", "CO2", "CL", "BUN", "CREATININE"    _____________________________________________________  PHYSICAL EXAMINATION:  Vital signs: /81   Ht 5' 2" (1.575 m)   Wt 84.8 kg (187 lb)   LMP  (LMP Unknown)   BMI 34.20 kg/m²   General: well developed and well nourished, alert, oriented times 3 and appears comfortable  Psychiatric: Normal  HEENT: Trachea Midline, No torticollis  Cardiovascular: No discernable arrhythmia  Pulmonary: No wheezing or stridor  Abdomen: No rebound or guarding  Extremities: No peripheral edema  Skin: No masses, erythema, lacerations, fluctation, ulcerations  Neurovascular: Sensation Intact to the Median, Ulnar, Radial Nerve, Motor Intact to the Median, Ulnar, Radial Nerve and Pulses Intact    MUSCULOSKELETAL EXAMINATION:  left ring finger:  Positive palpable nodule over the A1 pulley. Positive tenderness to palpation over A1 pulley. Negative clicking.    Negative catching.       _____________________________________________________  STUDIES REVIEWED:  No Studies to review      PROCEDURES PERFORMED:  Procedures  No Procedures performed today

## 2023-11-02 ENCOUNTER — OFFICE VISIT (OUTPATIENT)
Dept: GASTROENTEROLOGY | Facility: MEDICAL CENTER | Age: 68
End: 2023-11-02
Payer: COMMERCIAL

## 2023-11-02 VITALS
HEIGHT: 62 IN | TEMPERATURE: 98.3 F | WEIGHT: 186 LBS | DIASTOLIC BLOOD PRESSURE: 76 MMHG | HEART RATE: 79 BPM | OXYGEN SATURATION: 96 % | BODY MASS INDEX: 34.23 KG/M2 | SYSTOLIC BLOOD PRESSURE: 128 MMHG

## 2023-11-02 DIAGNOSIS — K21.9 GASTROESOPHAGEAL REFLUX DISEASE WITHOUT ESOPHAGITIS: ICD-10-CM

## 2023-11-02 DIAGNOSIS — K44.9 HIATAL HERNIA: Primary | ICD-10-CM

## 2023-11-02 DIAGNOSIS — R13.10 ODYNOPHAGIA: ICD-10-CM

## 2023-11-02 DIAGNOSIS — R13.10 DYSPHAGIA, UNSPECIFIED TYPE: ICD-10-CM

## 2023-11-02 PROCEDURE — 99214 OFFICE O/P EST MOD 30 MIN: CPT | Performed by: INTERNAL MEDICINE

## 2023-11-02 RX ORDER — PANTOPRAZOLE SODIUM 40 MG/1
40 TABLET, DELAYED RELEASE ORAL
Qty: 180 TABLET | Refills: 2 | Status: SHIPPED | OUTPATIENT
Start: 2023-11-02

## 2023-11-02 NOTE — PROGRESS NOTES
[facility] - Outpatient Follow-up Note  Kathy Rg 79 y.o. female MRN: [id number]  Encounter: [id number]          ASSESSMENT AND PLAN:  16-year-old female with history of DIPTI, asthma who presents for follow-up evaluation. 1. Hiatal hernia  2. Dysphagia, unspecified type  3. Odynophagia  4. Gastroesophageal reflux disease without esophagitis  She has a history of chronic dysphagia symptoms. She has undergone multiple endoscopies in the past which had shown hiatal hernia, and esophagitis which had healed with high-dose PPI therapy. She also underwent manometry which was normal.  She had previously had EGD showing possible intestinal metaplasia (Cody's esophagus) in 2020, and repeat biopsies showing multilayered epithelium thought to be a precursor to Cody's. Her last EGD in 2022 showed nodular mucosa in the GE junction showing mild chronic inflammation but no evidence of Cody's. In the setting of stopping her PPI due to concern for long-term side effect she has had recurrence of odynophagia and dysphagia. We discussed that her symptoms are likely due to recurrent esophagitis, possible peptic stricture. I recommend resuming pantoprazole 40 mg twice daily in addition to dietary/lifestyle antireflux measures. Given that she had an EGD less than 1 year ago we will hold on repeating endoscopic exam unless she has no improvement on high-dose PPI. If she has improvement on high-dose PPI then we will decrease her regimen to the lowest effective dose that controls her symptoms. - pantoprazole (PROTONIX) 40 mg tablet; Take 1 tablet (40 mg total) by mouth 2 (two) times a day before meals  Dispense: 180 tablet; Refill: 2    Follow-up in 3 months  ______________________________________________________________________    SUBJECTIVE: 16-year-old female with history of DIPTI, asthma who presents for follow-up evaluation. She was last seen in the GI office in July 2020.   At that time she noted difficulty swallowing and had a history of peptic ulcer disease and colon polyps. Interval history: She reports stopping her pantoprazole medication 7 8 months ago she was worried about long-term side effects such as dementia. 2 months ago she had new onset of odynophagia and dysphagia. She states the symptoms are different than when she had experience in the past as the pain with swallowing is new. She states this usually occurs with meats and chickens. The food will eventually pass and sometimes she has to regurgitate the food. She denies heartburn symptoms. She has no nausea or vomiting. Her appetite is good. She reports bowel movement usually a few times per week depending on what she eats      August 2020 EGD showed nonobstructing Schatzki's ring in the GE junction which was obliterated with tissue forceps, large hiatal hernia esophagitis and gastric polyps. Pathology showed fundic gland polyps, GE junction biopsy showed intestinal metaplasia consistent with Cody's esophagus, gastric biopsies were normal  8/2020 colonoscopy showed fair prep diverticulosis otherwise normal.  She was recommended repeat in 5 years  9/2020 manometry was normal  11/2020 EGD showed nonobstructing Schatzki's ring obliterated with tissue forceps medium hiatal hernia and mild antral gastritis. Distal esophageal biopsy showed multilayered epithelium, thought to be a precursor to Cody's esophagus she is recommend repeat EGD in 1 year  10/2022 EGD showed nodular mucosa in the GE junction and erythematous mucosa in the antrum. ,  Fundic gland polyp. Esophageal and gastric biopsies were normal      2017 colonoscopy showed cecum and sigmoid polyps, diverticulosis and hemorrhoids. Pathology showed tubular adenoma  2017 EGD showed small antral ulcer. Duodenal and gastric biopsies were normal    9/2020 barium swallow showed very small hiatal hernia        REVIEW OF SYSTEMS IS OTHERWISE NEGATIVE.   10 point review of systems is negative other than stated as per HPI    Historical Information   Past Medical History:   Diagnosis Date    Asthma     Cancer ([address])     skin    Chronic pain disorder     back    Colon polyp     CPAP (continuous positive airway pressure) dependence     GERD (gastroesophageal reflux disease)     Hearing loss of right ear     History of ear surgery     14ears old    Obesity     Skin cancer of chest, excluding breast     Sleep apnea     wears cpap     Past Surgical History:   Procedure Laterality Date    BLADDER SURGERY      sling     SECTION      EGD AND COLONOSCOPY N/A 2017    Procedure: EGD with bx AND COLONOSCOPY polypectomy x3;  Surgeon: Ellis Muhammad MD;  Location: [facility]; Service:     HAND SURGERY       Social History   Social History     Substance and Sexual Activity   Alcohol Use No     Social History     Substance and Sexual Activity   Drug Use Yes    Types: Marijuana     Social History     Tobacco Use   Smoking Status Never   Smokeless Tobacco Never     Family History   Problem Relation Age of Onset    No Known Problems Mother     Cancer Father 76        throat?     No Known Problems Sister     Stomach cancer Maternal Grandmother 67    No Known Problems Maternal Grandfather     No Known Problems Paternal Grandmother     No Known Problems Paternal Grandfather     No Known Problems Paternal Aunt        Meds/Allergies       Current Outpatient Medications:     albuterol (PROVENTIL HFA,VENTOLIN HFA) 90 mcg/act inhaler    diphenhydrAMINE (BENADRYL) 25 mg tablet    fluticasone-salmeterol (ADVAIR) 100-50 mcg/dose    levalbuterol (XOPENEX) 0.31 mg/3 mL nebulizer solution    metaxalone (SKELAXIN) 800 mg tablet    omeprazole (PriLOSEC) 40 MG capsule    pantoprazole (PROTONIX) 40 mg tablet    Allergies   Allergen Reactions    Shellfish-Derived Products - Food Allergy Anaphylaxis    Eggs Or Egg-Derived Products - Food Allergy GI Intolerance    Fruit C [Ascorbate - Food Allergy] strawberries  strawberries    Omeprazole      Other reaction(s): nausea    Acetazolamide Rash    Sulfa Antibiotics Rash           Objective     Blood pressure 128/76, pulse 79, temperature 98.3 °F (36.8 °C), temperature source Tympanic, height 5' 2" (1.575 m), weight 84.4 kg (186 lb), SpO2 96 %. Body mass index is 34.02 kg/m². PHYSICAL EXAM:      General Appearance:   Alert, cooperative, no distress   HEENT:   Normocephalic, atraumatic, anicteric. Neck:  Supple, symmetrical, trachea midline   Lungs:   Clear to auscultation bilaterally; no rales, rhonchi or wheezing; respirations unlabored    Heart[de-identified]   Regular rate and rhythm; no murmur, rub, or gallop. Abdomen:   Soft, non-tender, non-distended; normal bowel sounds; no masses, no organomegaly    Genitalia:   Deferred    Rectal:   Deferred    Extremities:  No cyanosis, clubbing or edema    Pulses:  2+ and symmetric    Skin:  No jaundice, rashes, or lesions    Lymph nodes:  No palpable cervical lymphadenopathy        Lab Results:   No visits with results within 1 Day(s) from this visit.    Latest known visit with results is:   Annual Exam on 11/15/2022   Component Date Value    Case Report 11/15/2022                      Value:Gynecologic Cytology Report                       Case: Vitor Fierro Provider:  Bebeto Childers MD          Collected:           11/15/2022 0940              Ordering Location:     [facility]  Received:            11/15/2022 0940                                     [facility] Screen:          [place], [place]                                                    Specimen:    LIQUID-BASED PAP, SCREENING, Cervix, Endocervical                                          Primary Interpretation 11/15/2022 Negative for intraepithelial lesion or malignancy     Specimen Adequacy 11/15/2022 Satisfactory for evaluation. (See note)     Note 11/15/2022                      Value: This result contains rich text formatting which cannot be displayed here. Additional Information 11/15/2022                      Value: This result contains rich text formatting which cannot be displayed here. HPV Other HR 11/15/2022 Negative     HPV16 11/15/2022 Negative     HPV18 11/15/2022 Negative          Radiology Results:   No results found.

## 2023-11-02 NOTE — PATIENT INSTRUCTIONS
Hiatal Hernia   WHAT YOU NEED TO KNOW:   A hiatal hernia is a condition that causes part of your stomach to bulge through the hiatus (small opening) in your diaphragm. The part of the stomach may move up and down, or it may get trapped above the diaphragm. DISCHARGE INSTRUCTIONS:   Call your local emergency number (911 in the [address]) if:   You have severe chest pain and sudden trouble breathing. Return to the emergency department if:   You have severe abdominal pain. You try to vomit but nothing comes out (retching). Your bowel movements are black or bloody. Your vomit looks like coffee grounds or has blood in it. Call your doctor if:   Your symptoms are getting worse. You are losing weight without trying. You have questions or concerns about your condition or care. Medicines:   Medicines  may be given to relieve heartburn symptoms. These medicines help to decrease or block stomach acid. You may also be given medicines that help to tighten the esophageal sphincter. Take your medicine as directed. Contact your healthcare provider if you think your medicine is not helping or if you have side effects. Tell your provider if you are allergic to any medicine. Keep a list of the medicines, vitamins, and herbs you take. Include the amounts, and when and why you take them. Bring the list or the pill bottles to follow-up visits. Carry your medicine list with you in case of an emergency. Manage your symptoms: The following nutrition and lifestyle changes may be recommended to relieve symptoms of heartburn:     Do not have food or liquid that make your symptoms worse. These may include spicy foods, fruit juices, alcohol, caffeine, chocolate, and mint. Eat several small meals during the day. Small meals give your stomach less food to digest.    Do not lie down or bend forward after you eat. Do not eat meals 2 to 3 hours before bedtime. This decreases your risk for reflux.     Maintain a healthy weight. If you not at a healthy weight, weight loss may help relieve your symptoms. Your healthcare provider will tell you what a healthy weight is for you. Your provider can help you create a safe weight loss plan, if needed. Sleep with your head and upper body elevated at least 6 inches. Use pillows or a foam wedge. You can also put 6-inch blocks under the head of your bed frame. Do not smoke. Smoking can increase your symptoms of heartburn. Follow up with your doctor as directed:  Write down your questions so you remember to ask them during your visits. © Copyright [employer]ido 2023 Information is for End User's use only and may not be sold, redistributed or otherwise used for commercial purposes. The above information is an  only. It is not intended as medical advice for individual conditions or treatments. Talk to your doctor, nurse or pharmacist before following any medical regimen to see if it is safe and effective for you. GERD (Gastroesophageal Reflux Disease)   AMBULATORY CARE:   Gastroesophageal reflux disease (GERD)  is reflux that happens more than 2 times a week for a few weeks. Reflux means acid and food in your stomach back up into your esophagus. GERD can cause other health problems over time if it is not treated. Common causes of GERD:  GERD often happens because the lower muscle (sphincter) of the esophagus does not close properly. The sphincter normally opens to let food into the stomach. It then closes to keep food and stomach acid in the stomach. If the sphincter does not close properly, stomach acid and food back up (reflux) into the esophagus.  The following may increase your risk for GERD:  Certain foods such as spicy foods, chocolate, foods that contain caffeine, peppermint, and fried foods    Hiatal hernia    Certain medicines such as calcium channel blockers (used to treat high blood pressure), allergy medicines, sedatives, or antidepressants    Pregnancy, obesity, or scleroderma    Lying down after a meal    Drinking alcohol or smoking cigarettes    Signs and symptoms:   Heartburn (burning pain in your chest)    Pain after meals that spreads to your neck, jaw, or shoulder    Pain that gets better when you change positions    Bitter or acid taste in your mouth    A dry cough    Trouble swallowing or pain with swallowing    Hoarseness or a sore throat    Burping or hiccups    Feeling full soon after you start eating    Call your local emergency number (911 in the [address]) if:   You have severe chest pain and sudden trouble breathing. Seek care immediately if:   You have trouble breathing after you vomit. You have trouble swallowing, or pain with swallowing. Your bowel movements are black, bloody, or tarry-looking. Your vomit looks like coffee grounds or has blood in it. Call your doctor or gastroenterologist if:   You feel full and cannot burp or vomit. You vomit large amounts, or you vomit often. You are losing weight without trying. Your symptoms get worse or do not improve with treatment. You have questions or concerns about your condition or care. Treatment for GERD:   Medicines  are used to decrease stomach acid. Medicine may also be used to help your lower esophageal sphincter and stomach contract (tighten) more. Surgery  is done to wrap the upper part of the stomach around the esophageal sphincter. This will strengthen the sphincter and prevent reflux. Manage GERD:       Do not have foods or drinks that may increase heartburn. These include chocolate, peppermint, fried or fatty foods, drinks that contain caffeine, or carbonated drinks (soda). Other foods include spicy foods, onions, tomatoes, and tomato-based foods. Do not have foods or drinks that can irritate your esophagus, such as citrus fruits, juices, and alcohol. Do not eat large meals.   When you eat a lot of food at one time, your stomach needs more acid to digest it. Eat 6 small meals each day instead of 3 large meals, and eat slowly. Do not eat meals 2 to 3 hours before bedtime. Elevate the head of your bed. Place 6-inch blocks under the head of your bed frame. You may also use more than one pillow under your head and shoulders while you sleep. Maintain a healthy weight. If you are overweight, weight loss may help relieve symptoms of GERD. Do not smoke. Smoking weakens the lower esophageal sphincter and increases the risk of GERD. Ask your healthcare provider for information if you currently smoke and need help to quit. E-cigarettes or smokeless tobacco still contain nicotine. Talk to your healthcare provider before you use these products. Do not put pressure on your abdomen. Pressure pushes acid up into your esophagus. Do not wear clothing that is tight around your waist. Do not bend over. Bend at the knees if you need to pick something up. Follow up with your doctor or gastroenterologist as directed:  Write down your questions so you remember to ask them during your visits. © Copyright [employer] 2023 Information is for End User's use only and may not be sold, redistributed or otherwise used for commercial purposes. The above information is an  only. It is not intended as medical advice for individual conditions or treatments. Talk to your doctor, nurse or pharmacist before following any medical regimen to see if it is safe and effective for you.

## 2024-02-07 ENCOUNTER — OFFICE VISIT (OUTPATIENT)
Dept: GASTROENTEROLOGY | Facility: MEDICAL CENTER | Age: 69
End: 2024-02-07
Payer: COMMERCIAL

## 2024-02-07 VITALS
HEART RATE: 79 BPM | TEMPERATURE: 98 F | HEIGHT: 62 IN | BODY MASS INDEX: 34.23 KG/M2 | WEIGHT: 186 LBS | OXYGEN SATURATION: 95 % | DIASTOLIC BLOOD PRESSURE: 76 MMHG | SYSTOLIC BLOOD PRESSURE: 138 MMHG

## 2024-02-07 DIAGNOSIS — G47.30 SLEEP APNEA IN ADULT: ICD-10-CM

## 2024-02-07 DIAGNOSIS — K21.9 GASTROESOPHAGEAL REFLUX DISEASE WITHOUT ESOPHAGITIS: Primary | ICD-10-CM

## 2024-02-07 DIAGNOSIS — Z00.00 ROUTINE CHECK-UP: ICD-10-CM

## 2024-02-07 DIAGNOSIS — J45.20 MILD INTERMITTENT ASTHMA WITHOUT COMPLICATION: ICD-10-CM

## 2024-02-07 DIAGNOSIS — Z12.11 COLON CANCER SCREENING: ICD-10-CM

## 2024-02-07 PROCEDURE — 99214 OFFICE O/P EST MOD 30 MIN: CPT | Performed by: PHYSICIAN ASSISTANT

## 2024-02-07 RX ORDER — PANTOPRAZOLE SODIUM 20 MG/1
20 TABLET, DELAYED RELEASE ORAL DAILY
Qty: 30 TABLET | Refills: 11 | Status: SHIPPED | OUTPATIENT
Start: 2024-02-07

## 2024-02-07 NOTE — PROGRESS NOTES
[facility] - Outpatient Follow-up Note  Dayna Su 68 y.o. female MRN: [id number]  Encounter: [id number]    Assessment and Plan    1. GERD  2. Dysphagia  She has a history of chronic dysphagia symptoms.  She has undergone multiple endoscopies in the past which had shown hiatal hernia and esophagitis which had healed with high-dose PPI therapy.  She also underwent manometry which was normal.  She had previously had EGD showing possible intestinal metaplasia (Cody's esophagus) in 2020 and repeat biopsies showing multilayered epithelium thought to be a precursor to Cody's.  Her last EGD in 2022 showed nodular mucosa in the GE junction showing mild chronic inflammation but no evidence of Cody's.  In the setting of stopping her PPI due to concern for long-term side effect she has had recurrence of odynophagia and dysphagia which resolves when medication is resumed, currently on as need PPI  -Recommend pantoprazole 20mg once daily, if ineffective can increase to 40mg    3. Colon cancer screening  Last colonoscopy in 2020 without polyps, repeat recommended 5 years later due to fair prep  -Repeat colonoscopy 2025 or sooner if clinically indicated    Follow up 3 months     ______________________________________________________________________    History of Present Illness  Dayna Su is a 68 y.o. female with DIPTI and asthma here for follow up evaluation of GERD and dysphagia.  She is currently taking pantoprazole 40 mg as needed.  She states that she typically takes this for 2 weeks at a time and will be good for 2 months.  She resumed the medication when she notices she is having trouble swallowing.  She has had multiple endoscopies in the past.  The most recent was 10/25/22 showed nodular mucosa in the GE junction showing mild chronic inflammation but no evidence of Cody's. She had two EGDs prior to that in 2020 with possible intestinal metaplasia (Cody's esophagus) and  repeat biopsies showing multilayered epithelium thought to be a precursor to Cody's.  In  she also underwent manometry which was normal and a colonoscopy with fair prep, diverticula in the sigmoid colon, and small internal and external hemorrhoids.  Recall was recommended 5 years later.      Review of Systems   Constitutional:  Negative for activity change, appetite change, chills, fatigue, fever and unexpected weight change.       Past Medical History  Past Medical History:   Diagnosis Date    Asthma     Cancer (HCC)     skin    Chronic pain disorder     back    Colon polyp     CPAP (continuous positive airway pressure) dependence     GERD (gastroesophageal reflux disease)     Hearing loss of right ear     History of ear surgery     18years old    Obesity     Skin cancer of chest, excluding breast     Sleep apnea     wears cpap       Past Social history  Past Surgical History:   Procedure Laterality Date    BLADDER SURGERY      sling     SECTION      EGD AND COLONOSCOPY N/A 2017    Procedure: EGD with bx AND COLONOSCOPY polypectomy x3;  Surgeon: Dona Love MD;  Location: [facility];  Service:     HAND SURGERY       Social History     Socioeconomic History    Marital status: /Civil Union     Spouse name: Not on file    Number of children: Not on file    Years of education: Not on file    Highest education level: Not on file   Occupational History    Not on file   Tobacco Use    Smoking status: Never    Smokeless tobacco: Never   Vaping Use    Vaping status: Never Used   Substance and Sexual Activity    Alcohol use: No    Drug use: Yes     Types: Marijuana    Sexual activity: Not Currently     Birth control/protection: Post-menopausal   Other Topics Concern    Not on file   Social History Narrative    Not on file     Social Determinants of Health     Financial Resource Strain: Not on file   Food Insecurity: Not on file   Transportation Needs: Not on file   Physical Activity: Not on file    Stress: Not on file   Social Connections: Not on file   Intimate Partner Violence: Not on file   Housing Stability: Not on file     Social History     Substance and Sexual Activity   Alcohol Use No     Social History     Substance and Sexual Activity   Drug Use Yes    Types: Marijuana     Social History     Tobacco Use   Smoking Status Never   Smokeless Tobacco Never       Past Family History  Family History   Problem Relation Age of Onset    No Known Problems Mother     Cancer Father 74        throat?    No Known Problems Sister     Stomach cancer Maternal Grandmother 72    No Known Problems Maternal Grandfather     No Known Problems Paternal Grandmother     No Known Problems Paternal Grandfather     No Known Problems Paternal Aunt        Current Medications  Current Outpatient Medications   Medication Sig Dispense Refill    albuterol (PROVENTIL HFA,VENTOLIN HFA) 90 mcg/act inhaler INHALE 2 PUFFS EVERY 6 HOURS AS NEEDED FOR WHEEZING      diphenhydrAMINE (BENADRYL) 25 mg tablet Take 25 mg by mouth every 6 (six) hours as needed for itching      levalbuterol (XOPENEX) 0.31 mg/3 mL nebulizer solution Take 1 ampule by nebulization every 4 (four) hours as needed for wheezing      pantoprazole (PROTONIX) 40 mg tablet Take 1 tablet (40 mg total) by mouth 2 (two) times a day before meals 180 tablet 2    fluticasone-salmeterol (ADVAIR) 100-50 mcg/dose Inhale 1 puff every 12 (twelve) hours (Patient not taking: Reported on 2/7/2024)      metaxalone (SKELAXIN) 800 mg tablet Take 800 mg by mouth Three times daily as needed       No current facility-administered medications for this visit.       Allergies  Allergies   Allergen Reactions    Shellfish-Derived Products - Food Allergy Anaphylaxis    Eggs Or Egg-Derived Products - Food Allergy GI Intolerance    Fruit C [Ascorbate - Food Allergy]      strawberries  strawberries    Omeprazole      Other reaction(s): nausea    Acetazolamide Rash    Sulfa Antibiotics Rash         The  "following portions of the patient's history were reviewed and updated as appropriate: allergies, current medications, past medical history, past social history, past surgical history and problem list.      Vitals  Vitals:    02/07/24 0931   BP: 138/76   BP Location: Left arm   Pulse: 79   Temp: 98 °F (36.7 °C)   SpO2: 95%   Weight: 84.4 kg (186 lb)   Height: 5' 2\" (1.575 m)         Physical Exam  Constitutional   General appearance: Patient is seated and in no acute distress, well appearing and well nourished.   Head and Face   Head and face: Normal.    Eyes   Conjunctiva and lids: No erythema, swelling or discharge.  Anicteric.  Ears, Nose, Mouth, and Throat   Hearing: Normal.    Neck: Supple, trachea midline.  Pulmonary   Respiratory effort: No increased work of breathing or signs of respiratory distress.    Cardiovascular   Examination of extremities for edema and/or varicosities: Normal.    Musculoskeletal   Gait and station: Normal   Skin   Skin and subcutaneous tissue: Warm, dry, and intact. No visible jaundice, lesions or rashes.  Psychiatric   Judgment and insight: Normal  Recent and remote memory:  Normal  Mood and affect: Normal      Results  No visits with results within 1 Day(s) from this visit.   Latest known visit with results is:   Annual Exam on 11/15/2022   Component Date Value    Case Report 11/15/2022                      Value:Gynecologic Cytology Report                       Case: [id number]                                  Authorizing Provider:  Rolanda Thompson MD          Collected:           11/15/2022 0940              Ordering Location:     [facility]  Received:            11/15/2022 0940                                     [facility]                                                           First Screen:          SUKH Mckeon                                                    Specimen:    LIQUID-BASED PAP, SCREENING, Cervix, Endocervical                   "                        Primary Interpretation 11/15/2022 Negative for intraepithelial lesion or malignancy     Specimen Adequacy 11/15/2022 Satisfactory for evaluation. (See note)     Note 11/15/2022                      Value:This result contains rich text formatting which cannot be displayed here.    Additional Information 11/15/2022                      Value:This result contains rich text formatting which cannot be displayed here.    HPV Other HR 11/15/2022 Negative     HPV16 11/15/2022 Negative     HPV18 11/15/2022 Negative        Radiology Results  No results found.    Orders  No orders of the defined types were placed in this encounter.       WDL

## 2024-05-16 ENCOUNTER — OFFICE VISIT (OUTPATIENT)
Dept: FAMILY MEDICINE CLINIC | Facility: CLINIC | Age: 69
End: 2024-05-16
Payer: COMMERCIAL

## 2024-05-16 ENCOUNTER — HOSPITAL ENCOUNTER (OUTPATIENT)
Dept: NON INVASIVE DIAGNOSTICS | Facility: HOSPITAL | Age: 69
Discharge: HOME | End: 2024-05-16
Attending: FAMILY MEDICINE
Payer: COMMERCIAL

## 2024-05-16 VITALS
RESPIRATION RATE: 20 BRPM | TEMPERATURE: 96.7 F | OXYGEN SATURATION: 97 % | SYSTOLIC BLOOD PRESSURE: 128 MMHG | HEIGHT: 62 IN | HEART RATE: 77 BPM | WEIGHT: 182.8 LBS | BODY MASS INDEX: 33.64 KG/M2 | DIASTOLIC BLOOD PRESSURE: 82 MMHG

## 2024-05-16 DIAGNOSIS — J30.1 SEASONAL ALLERGIC RHINITIS DUE TO POLLEN: ICD-10-CM

## 2024-05-16 DIAGNOSIS — Z23 ENCOUNTER FOR IMMUNIZATION: ICD-10-CM

## 2024-05-16 DIAGNOSIS — Z87.891 HISTORY OF TOBACCO USE, PRESENTING HAZARDS TO HEALTH: ICD-10-CM

## 2024-05-16 DIAGNOSIS — M85.89 OSTEOPENIA OF MULTIPLE SITES: ICD-10-CM

## 2024-05-16 DIAGNOSIS — R25.1 TREMOR: ICD-10-CM

## 2024-05-16 DIAGNOSIS — M47.26 OSTEOARTHRITIS OF SPINE WITH RADICULOPATHY, LUMBAR REGION: ICD-10-CM

## 2024-05-16 DIAGNOSIS — M79.605 LEFT LEG PAIN: ICD-10-CM

## 2024-05-16 DIAGNOSIS — G47.33 OSA (OBSTRUCTIVE SLEEP APNEA): ICD-10-CM

## 2024-05-16 DIAGNOSIS — E78.1 HYPERTRIGLYCERIDEMIA: ICD-10-CM

## 2024-05-16 DIAGNOSIS — Z00.00 ROUTINE CHECK-UP: ICD-10-CM

## 2024-05-16 DIAGNOSIS — G47.00 PERSISTENT INSOMNIA: ICD-10-CM

## 2024-05-16 DIAGNOSIS — J45.30 MILD PERSISTENT ASTHMA WITHOUT COMPLICATION: Primary | ICD-10-CM

## 2024-05-16 DIAGNOSIS — R73.01 IFG (IMPAIRED FASTING GLUCOSE): ICD-10-CM

## 2024-05-16 DIAGNOSIS — E55.9 VITAMIN D DEFICIENCY DISEASE: ICD-10-CM

## 2024-05-16 DIAGNOSIS — K27.9 PUD (PEPTIC ULCER DISEASE): ICD-10-CM

## 2024-05-16 DIAGNOSIS — E66.9 CLASS 1 OBESITY WITHOUT SERIOUS COMORBIDITY WITH BODY MASS INDEX (BMI) OF 33.0 TO 33.9 IN ADULT, UNSPECIFIED OBESITY TYPE: ICD-10-CM

## 2024-05-16 PROCEDURE — 90677 PCV20 VACCINE IM: CPT

## 2024-05-16 PROCEDURE — 93971 EXTREMITY STUDY: CPT

## 2024-05-16 PROCEDURE — 99204 OFFICE O/P NEW MOD 45 MIN: CPT | Performed by: FAMILY MEDICINE

## 2024-05-16 PROCEDURE — G0009 ADMIN PNEUMOCOCCAL VACCINE: HCPCS

## 2024-05-16 PROCEDURE — 93971 EXTREMITY STUDY: CPT | Performed by: SURGERY

## 2024-05-16 NOTE — PATIENT INSTRUCTIONS
Fasting labs    Prevnar 20 given today    CT chest to screen for lung cancer  And   Pulmonary function testing - as last done 2018    Take calcium and vit D    Return in 2 mos for physical and review labs    STAT venous doppler left leg to rule out clot.

## 2024-05-16 NOTE — PROGRESS NOTES
FAMILY PRACTICE OFFICE VISIT    NAME: Dayna Su    AGE: 68 y.o.   SEX: female  : 1955   MRN: [id number]    DATE: 2024  TIME: 10:12 AM    Assessment and Plan   1. Routine check-up  New patient  See below.    - Ambulatory Referral to Family Practice    2. Mild persistent asthma without complication  Using advair only once daily and symptoms mainly controlled.      3. Seasonal allergic rhinitis due to pollen      4. DIPTI (obstructive sleep apnea)  Wears cpap nightly      5. IFG (impaired fasting glucose)      6. Class 1 obesity without serious comorbidity with body mass index (BMI) of 33.0 to 33.9 in adult, unspecified obesity type      7. Hypertriglyceridemia      8. Vitamin D deficiency disease  Urge taking vit D      9. History of tobacco use, presenting hazards to health  CT and pft's    H/o stress test 2018      - CT lung screening program; Future  - Complete PFT with post bronchodilator; Future    10. Persistent insomnia      11. Tremor  Pt thinks improved once she retired.      12. PUD (peptic ulcer disease)  On PPI.  Aviod nsaids      13. Osteoarthritis of spine with radiculopathy, lumbar region  Using medical marijuana  Discussed that this can potentially interact with other meds - otc and rx  And to stop using if any adr's    - cannabidiol (EPIDIOLEX) 100 mg/ml SOLN; Pt vapes at night    14.  Left lower ext pain  X 1-2 years        Colonoscopy up to date - last done   And egd done  and    F/u with gi whendue.    Dexa 2023 - c/w osteopenia.  Recommendation to take calcium and vit D    Demonstrated exercises to help stretch out mid back   Pt to call if not improving  Can also use the roller    Patient Instructions   Fasting labs    Prevnar 20 given today    CT chest to screen for lung cancer  And   Pulmonary function testing - as last done     Take calcium and vit D    Return in 2 mos for physical and review labs    STAT venous doppler left leg to rule out  clot.              Chief Complaint     Chief Complaint   Patient presents with    Establish Care     Would like to leave [facility] to come here       History of Present Illness   Dayna Su is a 68 y.o.-year-old female who presents today for routine visit  New patient to get established.      Review of Systems   Review of Systems   Constitutional:  Negative for unexpected weight change.        Continues with hot flushes.  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       Respiratory:  Positive for apnea. Negative for cough, shortness of breath and wheezing.         Wears cpap     Cardiovascular:  Positive for palpitations. Negative for chest pain.        Does get palpitations around time of hot flushes  And once cooled off - palpitations resolve quickly     Gastrointestinal:  Negative for abdominal pain, blood in stool, constipation, diarrhea, nausea and vomiting.        Takes PPI daily in the am     Musculoskeletal:  Positive for back pain.        Back pain has been controlled  Mainly with the addition of medical marijuana  Takes skelaxin prn and infrequently      LLE leg pain - X 1-2 years.    Some right sided back pain - paraspinal - mid thoracic  Seems to be worse with repetitive movements     Allergic/Immunologic: Positive for environmental allergies.   Psychiatric/Behavioral:  Negative for dysphoric mood, self-injury, sleep disturbance and suicidal ideas. The patient is not nervous/anxious.         Lives with   Is retired.         Active Problem List     Patient Active Problem List   Diagnosis    Allergic rhinitis    Back pain    Class 1 obesity without serious comorbidity with body mass index (BMI) of 33.0 to 33.9 in adult    Colon polyps    Hypertriglyceridemia    IFG (impaired fasting glucose)    Mild persistent asthma without complication    IDPTI (obstructive sleep apnea)    Osteoarthritis of lumbar spine    Persistent insomnia    PUD (peptic ulcer disease)    Symptomatic menopausal or female  climacteric states    Tremor    Vitamin D deficiency disease         Past Medical History:  Past Medical History:   Diagnosis Date    Asthma     Cancer (HCC)     skin    Chronic pain disorder     back    Colon polyp     CPAP (continuous positive airway pressure) dependence     GERD (gastroesophageal reflux disease)     Hearing loss of right ear     History of ear surgery     18years old    Obesity     Skin cancer of chest, excluding breast     Sleep apnea     wears cpap       Past Surgical History:  Past Surgical History:   Procedure Laterality Date    BLADDER SURGERY      sling     SECTION      EGD AND COLONOSCOPY N/A 2017    Procedure: EGD with bx AND COLONOSCOPY polypectomy x3;  Surgeon: Dona Love MD;  Location: [facility];  Service:     HAND SURGERY         Family History:  Family History   Problem Relation Age of Onset    No Known Problems Mother     Cancer Father 74        throat?    No Known Problems Sister     Stomach cancer Maternal Grandmother 72    No Known Problems Maternal Grandfather     No Known Problems Paternal Grandmother     No Known Problems Paternal Grandfather     No Known Problems Paternal Aunt        Social History:  Social History     Socioeconomic History    Marital status: /Civil Union     Spouse name: Not on file    Number of children: Not on file    Years of education: Not on file    Highest education level: Not on file   Occupational History    Not on file   Tobacco Use    Smoking status: Never    Smokeless tobacco: Never   Vaping Use    Vaping status: Never Used   Substance and Sexual Activity    Alcohol use: No    Drug use: Yes     Types: Marijuana    Sexual activity: Not Currently     Birth control/protection: Post-menopausal   Other Topics Concern    Not on file   Social History Narrative    Not on file     Social Determinants of Health     Financial Resource Strain: Not on file   Food Insecurity: Not on file   Transportation Needs: Not on file   Physical  Activity: Not on file   Stress: Not on file   Social Connections: Not on file   Intimate Partner Violence: Not on file   Housing Stability: Not on file       Objective     Vitals:    05/16/24 0959   BP: 128/82   Pulse: 77   Resp: 20   Temp: (!) 96.7 °F (35.9 °C)   SpO2: 97%     Wt Readings from Last 3 Encounters:   05/16/24 82.9 kg (182 lb 12.8 oz)   02/07/24 84.4 kg (186 lb)   11/02/23 84.4 kg (186 lb)       Physical Exam  Vitals and nursing note reviewed.   Constitutional:       General: She is not in acute distress.     Appearance: Normal appearance. She is not ill-appearing or toxic-appearing.   Neck:      Vascular: No carotid bruit.   Cardiovascular:      Rate and Rhythm: Normal rate and regular rhythm.      Pulses: Normal pulses.      Heart sounds: Normal heart sounds. No murmur heard.  Pulmonary:      Effort: Pulmonary effort is normal. No respiratory distress.      Breath sounds: Wheezing present.      Comments: Mild wheeze right LL  No use of accessory respiratory muscles      Not coughing during encounter    Abdominal:      General: There is no distension.      Palpations: Abdomen is soft. There is no mass.      Tenderness: There is no abdominal tenderness. There is no guarding or rebound.   Musculoskeletal:      Cervical back: Neck supple. No tenderness.      Right lower leg: No edema.      Left lower leg: No edema.   Lymphadenopathy:      Cervical: No cervical adenopathy.   Skin:     General: Skin is warm.      Coloration: Skin is not jaundiced.   Neurological:      General: No focal deficit present.      Mental Status: She is alert and oriented to person, place, and time.   Psychiatric:         Mood and Affect: Mood normal.         Behavior: Behavior normal.         Thought Content: Thought content normal.         Judgment: Judgment normal.       Pertinent Laboratory/Diagnostic Studies:  Lab Results   Component Value Date    BUN 14 11/16/2021    CREATININE 0.68 11/16/2021    CALCIUM 9.9 11/16/2021    K  "4.1 11/16/2021    CO2 23 11/16/2021     11/16/2021     Lab Results   Component Value Date    ALT 33 11/16/2021    AST 19 11/16/2021    ALKPHOS 84 11/16/2021       No results found for: \"WBC\", \"HGB\", \"HCT\", \"MCV\", \"PLT\"    Lab Results   Component Value Date    TSH 1.28 11/16/2021       No results found for: \"CHOL\"  No results found for: \"TRIG\"  No results found for: \"HDL\"  No results found for: \"LDLCALC\"  Lab Results   Component Value Date    HGBA1C 5.8 (H) 11/16/2021       Results for orders placed or performed in visit on 11/15/22   HPV High Risk    Specimen: Thin-Prep Vial   Result Value Ref Range    HPV Other HR Negative Negative    HPV16 Negative Negative    HPV18 Negative Negative   Liquid-based pap, screening   Result Value Ref Range    Case Report       Gynecologic Cytology Report                       Case: [id number]                                  Authorizing Provider:  Rolanda Thompson MD          Collected:           11/15/2022 0940              Ordering Location:     [facility]  Received:            11/15/2022 0940                                     [facility]                                                           First Screen:          SUKH Mckeon                                                    Specimen:    LIQUID-BASED PAP, SCREENING, Cervix, Endocervical                                          Primary Interpretation Negative for intraepithelial lesion or malignancy     Specimen Adequacy Satisfactory for evaluation. (See note)     Note       No endocervical cells identified. It is difficult to differentiate between squamous metaplastic cells and parabasal cells in atrophic specimens due to numerous causes including menopause, postpartum changes and progestational agents.        Additional Information       [employer]'s FDA approved ,  and ThinPrep Imaging Duo System are utilized with strict adherence to the 's instruction manual to " prepare gynecologic and non-gynecologic cytology specimens for the production of ThinPrep slides as well as for gynecologic ThinPrep imaging. These processes have been validated by our laboratory and/or by the .  The Pap test is not a diagnostic procedure and should not be used as the sole means to detect cervical cancer. It is only a screening procedure to aid in the detection of cervical cancer and its precursors. Both false-negative and false-positive results have been experienced. Your patient's test result should be interpreted in this context together with the history and clinical findings.         Orders Placed This Encounter   Procedures    CT lung screening program       ALLERGIES:  Allergies   Allergen Reactions    Shellfish-Derived Products - Food Allergy Anaphylaxis    Eggs Or Egg-Derived Products - Food Allergy GI Intolerance    Fruit C [Ascorbate - Food Allergy]      strawberries  strawberries    Omeprazole      Other reaction(s): nausea    Acetazolamide Rash    Sulfa Antibiotics Rash       Current Medications     Current Outpatient Medications   Medication Sig Dispense Refill    albuterol (PROVENTIL HFA,VENTOLIN HFA) 90 mcg/act inhaler INHALE 2 PUFFS EVERY 6 HOURS AS NEEDED FOR WHEEZING      diphenhydrAMINE (BENADRYL) 25 mg tablet Take 25 mg by mouth every 6 (six) hours as needed for itching      fluticasone-salmeterol (ADVAIR) 100-50 mcg/dose Inhale 1 puff every 12 (twelve) hours (Patient not taking: Reported on 2/7/2024)      levalbuterol (XOPENEX) 0.31 mg/3 mL nebulizer solution Take 1 ampule by nebulization every 4 (four) hours as needed for wheezing      metaxalone (SKELAXIN) 800 mg tablet Take 800 mg by mouth Three times daily as needed      pantoprazole (PROTONIX) 20 mg tablet Take 1 tablet (20 mg total) by mouth daily 30 tablet 11     No current facility-administered medications for this visit.         Health Maintenance     Health Maintenance   Topic Date Due    Hepatitis C  Screening  Never done    Medicare Annual Wellness Visit (AWV)  Never done    PT PLAN OF CARE  Never done    RSV Vaccine Age 60+ Years (1 - 1-dose 60+ series) Never done    Pneumococcal Vaccine: 65+ Years (2 of 2 - PCV) 11/20/2020    Fall Risk  06/07/2022    Urinary Incontinence Screening  Never done    Colorectal Cancer Screening  08/14/2023    COVID-19 Vaccine (1 - 2023-24 season) Never done    Breast Cancer Screening: Mammogram  01/25/2024    Influenza Vaccine (Season Ended) 09/01/2024    Depression Screening  05/16/2025    Osteoporosis Screening  Completed    Zoster Vaccine  Completed    RSV Vaccine age 0-20 Months  Aged Out    HIB Vaccine  Aged Out    IPV Vaccine  Aged Out    Hepatitis A Vaccine  Aged Out    Meningococcal ACWY Vaccine  Aged Out    HPV Vaccine  Aged Out       There is no immunization history on file for this patient.    Depression Screening and Follow-up Plan: Patient was screened for depression during today's encounter. They screened negative with a PHQ-2 score of 0.    Urinary Incontinence Plan of Care: counseling topics discussed: practice Kegel (pelvic floor strengthening) exercises, use restroom every 2 hours, limit alcohol, caffeine, spicy foods, and acidic foods, limiting fluid intake 3-4 hours before bed and preventing constipation.         Rhiannon Bishop, DO

## 2024-05-17 NOTE — RESULT ENCOUNTER NOTE
Anastacio yip  I tried calling you last night with results of your ultrasound.  There is NO clot - so that is good news.  Its possible that you have a vein that is irritated in the area - vs isolated area of soft tissue injury from the past that has left scar tissue that remains uncomfortable.  Let me know if anything worsens  If it is really bothersome at times, you can try warm compresses.  Have a great weekend!  Dr stubbs.

## 2024-05-31 ENCOUNTER — TELEPHONE (OUTPATIENT)
Dept: FAMILY MEDICINE CLINIC | Facility: CLINIC | Age: 69
End: 2024-05-31

## 2024-06-03 ENCOUNTER — HOSPITAL ENCOUNTER (OUTPATIENT)
Dept: CT IMAGING | Facility: HOSPITAL | Age: 69
Discharge: HOME | End: 2024-06-03
Attending: FAMILY MEDICINE
Payer: COMMERCIAL

## 2024-06-03 DIAGNOSIS — Z87.891 HISTORY OF TOBACCO USE, PRESENTING HAZARDS TO HEALTH: ICD-10-CM

## 2024-06-03 PROCEDURE — 71271 CT THORAX LUNG CANCER SCR C-: CPT

## 2024-06-11 NOTE — RESULT ENCOUNTER NOTE
Anastacio yip  Your CT chest for lung cancer screening shows multiple lung nodules  All of which are sub centimeter in size but require followup imaging in 6 mos  Incidentally noted is a probable adrenal adenoma  Can discuss more at visit next month  Have a nice evening!  Dr stubbs.

## 2024-06-17 ENCOUNTER — HOSPITAL ENCOUNTER (OUTPATIENT)
Dept: PULMONOLOGY | Facility: HOSPITAL | Age: 69
Discharge: HOME | End: 2024-06-17
Attending: FAMILY MEDICINE
Payer: COMMERCIAL

## 2024-06-17 ENCOUNTER — LAB (OUTPATIENT)
Dept: LAB | Facility: HOSPITAL | Age: 69
End: 2024-06-17
Payer: COMMERCIAL

## 2024-06-17 DIAGNOSIS — E78.1 HYPERTRIGLYCERIDEMIA: ICD-10-CM

## 2024-06-17 DIAGNOSIS — R73.01 IFG (IMPAIRED FASTING GLUCOSE): ICD-10-CM

## 2024-06-17 DIAGNOSIS — Z87.891 HISTORY OF TOBACCO USE, PRESENTING HAZARDS TO HEALTH: ICD-10-CM

## 2024-06-17 DIAGNOSIS — K27.9 PUD (PEPTIC ULCER DISEASE): ICD-10-CM

## 2024-06-17 LAB
ALBUMIN SERPL BCP-MCNC: 4.3 G/DL (ref 3.5–5)
ALP SERPL-CCNC: 66 U/L (ref 34–104)
ALT SERPL W P-5'-P-CCNC: 14 U/L (ref 7–52)
ANION GAP SERPL CALCULATED.3IONS-SCNC: 6 MMOL/L (ref 4–13)
AST SERPL W P-5'-P-CCNC: 18 U/L (ref 13–39)
BASOPHILS # BLD AUTO: 0.05 THOUSANDS/ÂΜL (ref 0–0.1)
BASOPHILS NFR BLD AUTO: 1 % (ref 0–1)
BILIRUB SERPL-MCNC: 1.08 MG/DL (ref 0.2–1)
BUN SERPL-MCNC: 13 MG/DL (ref 5–25)
CALCIUM SERPL-MCNC: 9.7 MG/DL (ref 8.4–10.2)
CHLORIDE SERPL-SCNC: 101 MMOL/L (ref 96–108)
CHOLEST SERPL-MCNC: 186 MG/DL
CO2 SERPL-SCNC: 29 MMOL/L (ref 21–32)
CREAT SERPL-MCNC: 0.72 MG/DL (ref 0.6–1.3)
EOSINOPHIL # BLD AUTO: 0.37 THOUSAND/ÂΜL (ref 0–0.61)
EOSINOPHIL NFR BLD AUTO: 4 % (ref 0–6)
ERYTHROCYTE [DISTWIDTH] IN BLOOD BY AUTOMATED COUNT: 12.4 % (ref 11.6–15.1)
GFR SERPL CREATININE-BSD FRML MDRD: 86 ML/MIN/1.73SQ M
GLUCOSE P FAST SERPL-MCNC: 115 MG/DL (ref 65–99)
HCT VFR BLD AUTO: 41.3 % (ref 34.8–46.1)
HDLC SERPL-MCNC: 46 MG/DL
HGB BLD-MCNC: 13.8 G/DL (ref 11.5–15.4)
IMM GRANULOCYTES # BLD AUTO: 0.02 THOUSAND/UL (ref 0–0.2)
IMM GRANULOCYTES NFR BLD AUTO: 0 % (ref 0–2)
LDLC SERPL CALC-MCNC: 98 MG/DL (ref 0–100)
LYMPHOCYTES # BLD AUTO: 3.35 THOUSANDS/ÂΜL (ref 0.6–4.47)
LYMPHOCYTES NFR BLD AUTO: 39 % (ref 14–44)
MCH RBC QN AUTO: 31.9 PG (ref 26.8–34.3)
MCHC RBC AUTO-ENTMCNC: 33.4 G/DL (ref 31.4–37.4)
MCV RBC AUTO: 95 FL (ref 82–98)
MONOCYTES # BLD AUTO: 0.5 THOUSAND/ÂΜL (ref 0.17–1.22)
MONOCYTES NFR BLD AUTO: 6 % (ref 4–12)
NEUTROPHILS # BLD AUTO: 4.33 THOUSANDS/ÂΜL (ref 1.85–7.62)
NEUTS SEG NFR BLD AUTO: 50 % (ref 43–75)
NONHDLC SERPL-MCNC: 140 MG/DL
NRBC BLD AUTO-RTO: 0 /100 WBCS
PLATELET # BLD AUTO: 305 THOUSANDS/UL (ref 149–390)
PMV BLD AUTO: 10.8 FL (ref 8.9–12.7)
POTASSIUM SERPL-SCNC: 4.1 MMOL/L (ref 3.5–5.3)
PROT SERPL-MCNC: 7.7 G/DL (ref 6.4–8.4)
RBC # BLD AUTO: 4.33 MILLION/UL (ref 3.81–5.12)
SODIUM SERPL-SCNC: 136 MMOL/L (ref 135–147)
TRIGL SERPL-MCNC: 211 MG/DL
TSH SERPL DL<=0.05 MIU/L-ACNC: 2.47 UIU/ML (ref 0.45–4.5)
WBC # BLD AUTO: 8.62 THOUSAND/UL (ref 4.31–10.16)

## 2024-06-17 PROCEDURE — 94060 EVALUATION OF WHEEZING: CPT

## 2024-06-17 PROCEDURE — 85025 COMPLETE CBC W/AUTO DIFF WBC: CPT

## 2024-06-17 PROCEDURE — 80061 LIPID PANEL: CPT

## 2024-06-17 PROCEDURE — 94726 PLETHYSMOGRAPHY LUNG VOLUMES: CPT

## 2024-06-17 PROCEDURE — 80053 COMPREHEN METABOLIC PANEL: CPT

## 2024-06-17 PROCEDURE — 94726 PLETHYSMOGRAPHY LUNG VOLUMES: CPT | Performed by: INTERNAL MEDICINE

## 2024-06-17 PROCEDURE — 94729 DIFFUSING CAPACITY: CPT | Performed by: INTERNAL MEDICINE

## 2024-06-17 PROCEDURE — 84443 ASSAY THYROID STIM HORMONE: CPT

## 2024-06-17 PROCEDURE — 94760 N-INVAS EAR/PLS OXIMETRY 1: CPT

## 2024-06-17 PROCEDURE — 36415 COLL VENOUS BLD VENIPUNCTURE: CPT

## 2024-06-17 PROCEDURE — 94060 EVALUATION OF WHEEZING: CPT | Performed by: INTERNAL MEDICINE

## 2024-06-17 PROCEDURE — 94729 DIFFUSING CAPACITY: CPT

## 2024-06-17 RX ORDER — ALBUTEROL SULFATE 2.5 MG/3ML
2.5 SOLUTION RESPIRATORY (INHALATION) ONCE AS NEEDED
Status: COMPLETED | OUTPATIENT
Start: 2024-06-17 | End: 2024-06-17

## 2024-06-17 RX ADMIN — ALBUTEROL SULFATE 2.5 MG: 2.5 SOLUTION RESPIRATORY (INHALATION) at 09:19

## 2024-07-18 ENCOUNTER — OFFICE VISIT (OUTPATIENT)
Dept: FAMILY MEDICINE CLINIC | Facility: CLINIC | Age: 69
End: 2024-07-18

## 2024-07-18 VITALS
TEMPERATURE: 97.2 F | OXYGEN SATURATION: 94 % | HEART RATE: 87 BPM | SYSTOLIC BLOOD PRESSURE: 132 MMHG | DIASTOLIC BLOOD PRESSURE: 68 MMHG | RESPIRATION RATE: 20 BRPM

## 2024-07-18 DIAGNOSIS — R91.8 LUNG NODULES: ICD-10-CM

## 2024-07-18 DIAGNOSIS — G47.33 OSA (OBSTRUCTIVE SLEEP APNEA): ICD-10-CM

## 2024-07-18 DIAGNOSIS — E66.9 CLASS 1 OBESITY WITHOUT SERIOUS COMORBIDITY WITH BODY MASS INDEX (BMI) OF 33.0 TO 33.9 IN ADULT, UNSPECIFIED OBESITY TYPE: ICD-10-CM

## 2024-07-18 DIAGNOSIS — R73.01 IFG (IMPAIRED FASTING GLUCOSE): ICD-10-CM

## 2024-07-18 DIAGNOSIS — Z00.00 MEDICARE ANNUAL WELLNESS VISIT, SUBSEQUENT: Primary | ICD-10-CM

## 2024-07-18 DIAGNOSIS — J45.30 MILD PERSISTENT ASTHMA WITHOUT COMPLICATION: ICD-10-CM

## 2024-07-18 DIAGNOSIS — E78.1 HYPERTRIGLYCERIDEMIA: ICD-10-CM

## 2024-07-18 DIAGNOSIS — M85.89 OSTEOPENIA OF MULTIPLE SITES: ICD-10-CM

## 2024-07-18 NOTE — PATIENT INSTRUCTIONS
Start using the advair 2x/day - rinse after use  This should help to reduce amount of times you need the albuterol or xoponex    (Do not use BOTH the xopenex and albuterol - as they are the same type of  medications)    Schedule mammogram  And call GI to find out when colonoscopy is due    Call central scheduling also for CT lung repeat imaging due 12/2024      Consider RSV vaccine at pharmacy  Recommend flu shot as well    Repeat fasting labs and return in 6 mos

## 2024-07-18 NOTE — PROGRESS NOTES
Ambulatory Visit  Name: Dayna Su      : 1955      MRN: [id number]  Encounter Provider: hRiannon Bishop DO  Encounter Date: 2024   Encounter department: [facility] PRIMARY [facility]    Assessment & Plan       1. Lung nodules  Shortterm f/u recommended  No longer smoking  If any changes - will send to pulmonary    - CT lung nodule follow-up; Future    2. Medicare annual wellness visit, subsequent  Anticipatory guidance and preventative medicine discussed  Discussed that if pt decides to do some PT for left calf discomfort- she should let me know  Had prevnar 20 at last visit  Urge RSV vaccine and covid vaccine  Declines covid shot.    Goes to eye dr and dentist.    Patient Instructions   Start using the advair 2x/day - rinse after use  This should help to reduce amount of times you need the albuterol or xoponex    (Do not use BOTH the xopenex and albuterol - as they are the same type of  medications)    Schedule mammogram  And call GI to find out when colonoscopy is due    Call central scheduling also for CT lung repeat imaging due 2024      Consider RSV vaccine at pharmacy  Recommend flu shot as well    Repeat fasting labs and return in 6 mos            3. Mild persistent asthma without complication  See below      4. DIPTI (obstructive sleep apnea)  Wears cpap.      5. IFG (impaired fasting glucose)  See below      6. Osteopenia of multiple sites  Rpeat dexa due   Last done  with gyn      7. Class 1 obesity without serious comorbidity with body mass index (BMI) of 33.0 to 33.9 in adult, unspecified obesity type  advise increase in whole grains - fresh fruits, veggies and whole grain cereals and breads; and less simple sugars, processed foods and white floured products, including decreasing intake if sweetened beverages; also encourage exercise for overall cardiovascular health      8. Hypertriglyceridemia  Discussed that hypertrig can be related to elevated  sugars            Reviewed labs, pft's and ct lung  As well as prior arpit doppler.        History of Present Illness     HPI     Pt had labs and here for AWV    Patient Care Team:  Rhiannon Bishop DO as PCP - General (Family Medicine)  Rolanda Thompson MD as PCP - OBGYN (Obstetrics and Gynecology)  MD Dona Braswell MD as Endoscopist    Review of Systems   Constitutional:  Negative for fever.   HENT:  Positive for postnasal drip.    Respiratory:  Positive for wheezing. Negative for cough and shortness of breath.         Has been using xopenex recently due to humidity.  Has not been using the advair.       Cardiovascular:  Negative for chest pain, palpitations and leg swelling.   Gastrointestinal:  Negative for abdominal pain, blood in stool, constipation, diarrhea, nausea and vomiting.        GERD controlled.     Genitourinary:  Negative for dysuria, vaginal bleeding and vaginal discharge.   Musculoskeletal:         Prior venous doppler (-) for DVT  Still gets occasional pain in left lower leg - that comes and goes - not worsening  Slight cramping     Psychiatric/Behavioral:  Negative for dysphoric mood, self-injury and suicidal ideas. The patient is not nervous/anxious.      Medical History Reviewed by provider this encounter:       Annual Wellness Visit Questionnaire   Dayna is here for her Subsequent Wellness visit. Last Medicare Wellness visit information reviewed, patient interviewed and updates made to the record today.      Health Risk Assessment:   Patient rates overall health as very good. Patient feels that their physical health rating is same. Patient is very satisfied with their life. Eyesight was rated as slightly worse. Hearing was rated as same. Patient feels that their emotional and mental health rating is same. Patients states they are never, rarely angry. Patient states they are sometimes unusually tired/fatigued. Pain experienced in the last 7 days has been some. Patient's pain  rating has been 5/10. Patient states that she has experienced no weight loss or gain in last 6 months.     Depression Screening:   PHQ-2 Score: 0      Fall Risk Screening:   In the past year, patient has experienced: no history of falling in past year      Urinary Incontinence Screening:   Patient has leaked urine accidently in the last six months.     Home Safety:  Patient does not have trouble with stairs inside or outside of their home. Patient has working smoke alarms and has working carbon monoxide detector. Home safety hazards include: none.     Nutrition:   Current diet is Regular.     Medications:   Patient is currently taking over-the-counter supplements. OTC medications include: see medication list. Patient is able to manage medications.     Activities of Daily Living (ADLs)/Instrumental Activities of Daily Living (IADLs):   Walk and transfer into and out of bed and chair?: Yes  Dress and groom yourself?: Yes    Bathe or shower yourself?: Yes    Feed yourself? Yes  Do your laundry/housekeeping?: Yes  Manage your money, pay your bills and track your expenses?: Yes  Make your own meals?: Yes    Do your own shopping?: Yes    Previous Hospitalizations:   Any hospitalizations or ED visits within the last 12 months?: No      Advance Care Planning:   Living will: No    Durable POA for healthcare: No    Advanced directive: No    Advanced directive counseling given: Yes      Cognitive Screening:   Provider or family/friend/caregiver concerned regarding cognition?: No    PREVENTIVE SCREENINGS      Cardiovascular Screening:    General: Screening Not Indicated and History Lipid Disorder      Diabetes Screening:     General: Screening Current      Colorectal Cancer Screening:     General: Screening Current      Breast Cancer Screening:     General: Screening Current      Cervical Cancer Screening:    General: Screening Not Indicated      Osteoporosis Screening:    General: Screening Current      Abdominal Aortic  Aneurysm (AAA) Screening:        General: Screening Not Indicated      Lung Cancer Screening:     General: Screening Not Indicated    Screening, Brief Intervention, and Referral to Treatment (SBIRT)    Screening  Typical number of drinks in a day: 0  Typical number of drinks in a week: 0  Interpretation: Low risk drinking behavior.    Single Item Drug Screening:  How often have you used an illegal drug (including marijuana) or a prescription medication for non-medical reasons in the past year? never    Single Item Drug Screen Score: 0  Interpretation: Negative screen for possible drug use disorder    Other Counseling Topics:   Car/seat belt/driving safety, skin self-exam and calcium and vitamin D intake and regular weightbearing exercise.     Social Determinants of Health     Food Insecurity: No Food Insecurity (7/18/2024)    Hunger Vital Sign     Worried About Running Out of Food in the Last Year: Never true     Ran Out of Food in the Last Year: Never true   Transportation Needs: No Transportation Needs (7/18/2024)    PRAPARE - Transportation     Lack of Transportation (Medical): No     Lack of Transportation (Non-Medical): No   Housing Stability: Unknown (7/18/2024)    Housing Stability Vital Sign     Unable to Pay for Housing in the Last Year: No   Utilities: Not At Risk (7/18/2024)    [facility] Utilities     Threatened with loss of utilities: No     No results found.    Objective     /68 (BP Location: Right arm, Patient Position: Sitting, Cuff Size: Large)   Pulse 87   Temp (!) 97.2 °F (36.2 °C) (Tympanic)   Resp 20   LMP  (LMP Unknown)   SpO2 94%     Physical Exam  Vitals and nursing note reviewed.   Constitutional:       General: She is not in acute distress.     Appearance: Normal appearance. She is not ill-appearing or toxic-appearing.   HENT:      Right Ear: Tympanic membrane normal. There is no impacted cerumen.      Left Ear: Tympanic membrane normal. There is no impacted cerumen.      Nose: Nose  normal. No congestion.      Mouth/Throat:      Mouth: Mucous membranes are moist.      Pharynx: No oropharyngeal exudate or posterior oropharyngeal erythema.   Eyes:      General: No scleral icterus.     Extraocular Movements: Extraocular movements intact.      Pupils: Pupils are equal, round, and reactive to light.   Neck:      Vascular: No carotid bruit.   Cardiovascular:      Rate and Rhythm: Normal rate and regular rhythm.      Pulses: Normal pulses.      Heart sounds: Normal heart sounds. No murmur heard.  Pulmonary:      Effort: Pulmonary effort is normal. No respiratory distress.      Breath sounds: Normal breath sounds. No wheezing, rhonchi or rales.      Comments: End inspiratory wheeze LLL  No use of accessory respiratory muscles    Not coughing    Abdominal:      General: There is no distension.      Palpations: Abdomen is soft. There is no mass.      Tenderness: There is no abdominal tenderness. There is no guarding or rebound.   Musculoskeletal:         General: No swelling, tenderness, deformity or signs of injury.      Cervical back: Neck supple.      Right lower leg: No edema.      Left lower leg: No edema.      Comments: (-) elena's sign  No calf tenderness or edema     Lymphadenopathy:      Cervical: No cervical adenopathy.   Skin:     General: Skin is warm.      Coloration: Skin is not jaundiced.      Findings: No rash.   Neurological:      General: No focal deficit present.      Mental Status: She is alert and oriented to person, place, and time.   Psychiatric:         Mood and Affect: Mood normal.         Behavior: Behavior normal.         Thought Content: Thought content normal.         Judgment: Judgment normal.

## 2024-10-30 ENCOUNTER — OFFICE VISIT (OUTPATIENT)
Dept: GASTROENTEROLOGY | Facility: MEDICAL CENTER | Age: 69
End: 2024-10-30
Payer: COMMERCIAL

## 2024-10-30 VITALS
SYSTOLIC BLOOD PRESSURE: 134 MMHG | HEIGHT: 62 IN | WEIGHT: 182 LBS | BODY MASS INDEX: 33.49 KG/M2 | OXYGEN SATURATION: 98 % | TEMPERATURE: 97.8 F | HEART RATE: 83 BPM | DIASTOLIC BLOOD PRESSURE: 85 MMHG

## 2024-10-30 DIAGNOSIS — R13.19 OTHER DYSPHAGIA: ICD-10-CM

## 2024-10-30 DIAGNOSIS — K21.9 GASTROESOPHAGEAL REFLUX DISEASE WITHOUT ESOPHAGITIS: Primary | ICD-10-CM

## 2024-10-30 DIAGNOSIS — Z12.11 COLON CANCER SCREENING: ICD-10-CM

## 2024-10-30 PROCEDURE — 99214 OFFICE O/P EST MOD 30 MIN: CPT | Performed by: PHYSICIAN ASSISTANT

## 2024-10-30 RX ORDER — FAMOTIDINE 40 MG/1
40 TABLET, FILM COATED ORAL DAILY
Qty: 90 TABLET | Refills: 2 | Status: SHIPPED | OUTPATIENT
Start: 2024-10-30

## 2024-10-30 NOTE — PROGRESS NOTES
Ambulatory Visit  Name: Dayna Su      : 1955      MRN: [id number]  Encounter Provider: Leatha Choi PA-C  Encounter Date: 10/30/2024   Encounter department: [facility] [place]    Assessment & Plan  Gastroesophageal reflux disease without esophagitis  She has a history of chronic dysphagia symptoms.  She has undergone multiple endoscopies in the past which had shown hiatal hernia and esophagitis which had healed with high-dose PPI therapy.  She also underwent manometry which was normal.  She had previously had EGD showing possible intestinal metaplasia (Cody's esophagus) in  and repeat biopsies showing multilayered epithelium thought to be a precursor to Cody's. Her last EGD in  showed nodular mucosa in the GE junction showing mild chronic inflammation but no evidence of Cody's. She wanted to titrate off of PPI which she has done successfully and is doing well on Pepcid. Will continue this and plan to repeat EGD with next colonoscopy 2025. Will increase back to PPI if needed depending on EGD results.  Orders:    famotidine (PEPCID) 40 MG tablet; Take 1 tablet (40 mg total) by mouth daily    Other dysphagia  See above        Colon cancer screening  Last colonoscopy in 2020 without polyps, repeat recommended 5 years later due to fair prep. Repeat colonoscopy 2025 or sooner if clinically indicated       Follow up 6 months     History of Present Illness     Dayna Su is a 69 y.o. female who presents for follow-up evaluation of GERD and dysphagia.  The patient was previously on PPI therapy but given concern with long-term side effects she titrated herself off of this medication and onto Pepcid.  She states that she has no breakthrough GERD symptoms on Pepcid.  She does have some continued dysphagia where it feels as though food is getting hung up in her throat.  This is rare and intermittent.      Review of Systems   Constitutional:   "Negative for activity change, appetite change, chills, fatigue, fever and unexpected weight change.   HENT:  Positive for trouble swallowing.    Gastrointestinal:  Negative for abdominal distention, abdominal pain, anal bleeding, blood in stool, constipation, diarrhea, nausea, rectal pain and vomiting.       Medical History Reviewed by provider this encounter:       Current Outpatient Medications on File Prior to Visit   Medication Sig Dispense Refill    albuterol (PROVENTIL HFA,VENTOLIN HFA) 90 mcg/act inhaler INHALE 2 PUFFS EVERY 6 HOURS AS NEEDED FOR WHEEZING      cannabidiol (EPIDIOLEX) 100 mg/ml SOLN Pt vapes at night      diphenhydrAMINE (BENADRYL) 25 mg tablet Take 25 mg by mouth every 6 (six) hours as needed for itching      levalbuterol (XOPENEX) 0.31 mg/3 mL nebulizer solution Take 1 ampule by nebulization every 4 (four) hours as needed for wheezing      pantoprazole (PROTONIX) 20 mg tablet Take 1 tablet (20 mg total) by mouth daily 30 tablet 11    fluticasone-salmeterol (ADVAIR) 100-50 mcg/dose Inhale 1 puff every 12 (twelve) hours (Patient not taking: Reported on 2/7/2024)      metaxalone (SKELAXIN) 800 mg tablet Take 800 mg by mouth Three times daily as needed       No current facility-administered medications on file prior to visit.      Social History     Tobacco Use    Smoking status: Never    Smokeless tobacco: Never   Vaping Use    Vaping status: Never Used   Substance and Sexual Activity    Alcohol use: No    Drug use: Yes     Types: Marijuana    Sexual activity: Not Currently     Birth control/protection: Post-menopausal         Objective     /85 (BP Location: Left arm)   Pulse 83   Temp 97.8 °F (36.6 °C)   Ht 5' 2\" (1.575 m)   Wt 82.6 kg (182 lb)   LMP  (LMP Unknown)   SpO2 98%   BMI 33.29 kg/m²       Physical Exam  Constitutional:       General: She is not in acute distress.     Appearance: Normal appearance. She is normal weight. She is not ill-appearing, toxic-appearing or " diaphoretic.   HENT:      Head: Normocephalic and atraumatic.      Nose: No congestion or rhinorrhea.   Eyes:      General:         Right eye: No discharge.         Left eye: No discharge.   Pulmonary:      Effort: Pulmonary effort is normal.   Musculoskeletal:         General: Normal range of motion.      Cervical back: Normal range of motion.   Skin:     Coloration: Skin is not jaundiced.   Neurological:      Mental Status: She is alert.   Psychiatric:         Mood and Affect: Mood normal.         Behavior: Behavior normal.         Thought Content: Thought content normal.         Judgment: Judgment normal.

## 2025-01-09 ENCOUNTER — OFFICE VISIT (OUTPATIENT)
Dept: OBGYN CLINIC | Facility: MEDICAL CENTER | Age: 70
End: 2025-01-09
Payer: COMMERCIAL

## 2025-01-09 ENCOUNTER — APPOINTMENT (OUTPATIENT)
Dept: RADIOLOGY | Facility: MEDICAL CENTER | Age: 70
End: 2025-01-09
Payer: COMMERCIAL

## 2025-01-09 VITALS — HEIGHT: 62 IN | WEIGHT: 183 LBS | BODY MASS INDEX: 33.68 KG/M2

## 2025-01-09 DIAGNOSIS — G89.29 CHRONIC RIGHT SHOULDER PAIN: ICD-10-CM

## 2025-01-09 DIAGNOSIS — M25.511 CHRONIC RIGHT SHOULDER PAIN: ICD-10-CM

## 2025-01-09 DIAGNOSIS — M75.81 TENDINITIS OF RIGHT ROTATOR CUFF: ICD-10-CM

## 2025-01-09 DIAGNOSIS — M25.511 CHRONIC RIGHT SHOULDER PAIN: Primary | ICD-10-CM

## 2025-01-09 DIAGNOSIS — G89.29 CHRONIC RIGHT SHOULDER PAIN: Primary | ICD-10-CM

## 2025-01-09 PROCEDURE — 99203 OFFICE O/P NEW LOW 30 MIN: CPT | Performed by: EMERGENCY MEDICINE

## 2025-01-09 PROCEDURE — 20610 DRAIN/INJ JOINT/BURSA W/O US: CPT | Performed by: EMERGENCY MEDICINE

## 2025-01-09 PROCEDURE — 73030 X-RAY EXAM OF SHOULDER: CPT

## 2025-01-09 RX ORDER — ROPIVACAINE HYDROCHLORIDE 2 MG/ML
4 INJECTION, SOLUTION EPIDURAL; INFILTRATION; PERINEURAL
Status: COMPLETED | OUTPATIENT
Start: 2025-01-09 | End: 2025-01-09

## 2025-01-09 RX ORDER — TRIAMCINOLONE ACETONIDE 40 MG/ML
40 INJECTION, SUSPENSION INTRA-ARTICULAR; INTRAMUSCULAR
Status: COMPLETED | OUTPATIENT
Start: 2025-01-09 | End: 2025-01-09

## 2025-01-09 RX ADMIN — TRIAMCINOLONE ACETONIDE 40 MG: 40 INJECTION, SUSPENSION INTRA-ARTICULAR; INTRAMUSCULAR at 14:15

## 2025-01-09 RX ADMIN — ROPIVACAINE HYDROCHLORIDE 4 ML: 2 INJECTION, SOLUTION EPIDURAL; INFILTRATION; PERINEURAL at 14:15

## 2025-01-09 NOTE — PROGRESS NOTES
Assessment/Plan:    Diagnoses and all orders for this visit:    Chronic right shoulder pain  -     XR shoulder 2+ vw right; Future  -     Large joint arthrocentesis: R subacromial bursa    Tendinitis of right rotator cuff  -     Large joint arthrocentesis: R subacromial bursa    Right subacromial CSI provided today.  She had benefited in the past from left shoulder injection  Provided home exercises to perform also to consider formal physical therapy.  X-ray of the right shoulder was obtained today showing degenerative changes at the RTC insertion    Return in about 4 weeks (around 2/6/2025).      Subjective:   Patient ID: Dayna Su is a 69 y.o. female.    NP presents for chronic worsening right shoulder anterior lateral worse with use, denies hx injuries, however similar symptoms of the left shoulder which resolved with CSI.  Takes Aleve PRN however has GI issues      Review of Systems    The following portions of the patient's chart were reviewed and updated as appropriate:   Allergy:    Allergies   Allergen Reactions    Shellfish-Derived Products - Food Allergy Anaphylaxis    Eggs Or Egg-Derived Products - Food Allergy GI Intolerance    Fruit C [Ascorbate - Food Allergy]      strawberries  strawberries    Omeprazole      Other reaction(s): nausea    Acetazolamide Rash    Sulfa Antibiotics Rash       Medications:    Current Outpatient Medications:     albuterol (PROVENTIL HFA,VENTOLIN HFA) 90 mcg/act inhaler, INHALE 2 PUFFS EVERY 6 HOURS AS NEEDED FOR WHEEZING, Disp: , Rfl:     cannabidiol (EPIDIOLEX) 100 mg/ml SOLN, Pt vapes at night, Disp: , Rfl:     diphenhydrAMINE (BENADRYL) 25 mg tablet, Take 25 mg by mouth every 6 (six) hours as needed for itching, Disp: , Rfl:     famotidine (PEPCID) 40 MG tablet, Take 1 tablet (40 mg total) by mouth daily, Disp: 90 tablet, Rfl: 2    levalbuterol (XOPENEX) 0.31 mg/3 mL nebulizer solution, Take 1 ampule by nebulization every 4 (four) hours as needed for wheezing,  "Disp: , Rfl:     fluticasone-salmeterol (ADVAIR) 100-50 mcg/dose, Inhale 1 puff every 12 (twelve) hours (Patient not taking: Reported on 1/9/2025), Disp: , Rfl:     metaxalone (SKELAXIN) 800 mg tablet, Take 800 mg by mouth Three times daily as needed, Disp: , Rfl:     pantoprazole (PROTONIX) 20 mg tablet, Take 1 tablet (20 mg total) by mouth daily, Disp: 30 tablet, Rfl: 11    Patient Active Problem List   Diagnosis    Allergic rhinitis    Back pain    Class 1 obesity without serious comorbidity with body mass index (BMI) of 33.0 to 33.9 in adult    Colon polyps    Hypertriglyceridemia    IFG (impaired fasting glucose)    Mild persistent asthma without complication    DIPTI (obstructive sleep apnea)    Osteoarthritis of lumbar spine    Persistent insomnia    PUD (peptic ulcer disease)    Symptomatic menopausal or female climacteric states    Tremor    Vitamin D deficiency disease    Osteopenia of multiple sites       Objective:  Ht 5' 2\" (1.575 m)   Wt 83 kg (183 lb)   LMP  (LMP Unknown)   BMI 33.47 kg/m²     Right Shoulder Exam     Range of Motion   Active abduction:  abnormal   External rotation:  normal   Internal rotation 0 degrees:  abnormal     Muscle Strength   External rotation: 5/5     Tests   Drop arm: negative    Other   Erythema: absent      Left Shoulder Exam     Range of Motion   External rotation:  normal     Muscle Strength   External rotation: 5/5              Physical Exam      Neurologic Exam    Large joint arthrocentesis: R subacromial bursa  Universal Protocol:  Consent: Verbal consent obtained.  Risks and benefits: risks, benefits and alternatives were discussed  Consent given by: patient  Time out: Immediately prior to procedure a \"time out\" was called to verify the correct patient, procedure, equipment, support staff and site/side marked as required.  Timeout called at: 1/9/2025 2:40 PM.  Patient understanding: patient states understanding of the procedure being performed  Test results: test " results available and properly labeled  Site marked: the operative site was marked  Patient identity confirmed: verbally with patient  Supporting Documentation  Indications: pain   Procedure Details  Location: shoulder - R subacromial bursa  Preparation: Patient was prepped and draped in the usual sterile fashion  Needle size: 22 G  Ultrasound guidance: no  Approach: posterolateral  Medications administered: 40 mg triamcinolone acetonide 40 mg/mL; 4 mL ropivacaine 0.2 %    Patient tolerance: patient tolerated the procedure well with no immediate complications  Dressing:  Sterile dressing applied    No erythema of Shoulder(s)        I have personally reviewed pertinent films in PACS and my interpretation is Xrays Right Shoulder.            Past Medical History:   Diagnosis Date    Asthma     Cancer (HCC)     skin    Chronic pain disorder     back    Colon polyp     CPAP (continuous positive airway pressure) dependence     GERD (gastroesophageal reflux disease)     Hearing loss of right ear     History of ear surgery     18years old    Obesity     Skin cancer of chest, excluding breast     Sleep apnea     wears cpap       Past Surgical History:   Procedure Laterality Date    BLADDER SURGERY      sling     SECTION      EGD AND COLONOSCOPY N/A 2017    Procedure: EGD with bx AND COLONOSCOPY polypectomy x3;  Surgeon: Dona Love MD;  Location: [facility];  Service:     HAND SURGERY         Social History     Socioeconomic History    Marital status: /Civil Union     Spouse name: Not on file    Number of children: Not on file    Years of education: Not on file    Highest education level: Not on file   Occupational History    Not on file   Tobacco Use    Smoking status: Never    Smokeless tobacco: Never   Vaping Use    Vaping status: Never Used   Substance and Sexual Activity    Alcohol use: No    Drug use: Yes     Types: Marijuana    Sexual activity: Not Currently     Birth control/protection:  Post-menopausal   Other Topics Concern    Not on file   Social History Narrative    Not on file     Social Drivers of Health     Financial Resource Strain: Not on file   Food Insecurity: No Food Insecurity (7/18/2024)    Nursing - Inadequate Food Risk Classification     Worried About Running Out of Food in the Last Year: Never true     Ran Out of Food in the Last Year: Never true     Ran Out of Food in the Last Year: Not on file   Transportation Needs: No Transportation Needs (7/18/2024)    PRAPARE - Transportation     Lack of Transportation (Medical): No     Lack of Transportation (Non-Medical): No   Physical Activity: Not on file   Stress: Not on file   Social Connections: Not on file   Intimate Partner Violence: Not on file   Housing Stability: Unknown (7/18/2024)    Housing Stability Vital Sign     Unable to Pay for Housing in the Last Year: No     Number of Times Moved in the Last Year: Not on file     Homeless in the Last Year: Not on file       Family History   Problem Relation Age of Onset    No Known Problems Mother     Cancer Father 74        throat?    No Known Problems Sister     Stomach cancer Maternal Grandmother 72    No Known Problems Maternal Grandfather     No Known Problems Paternal Grandmother     No Known Problems Paternal Grandfather     No Known Problems Paternal Aunt

## 2025-01-09 NOTE — PATIENT INSTRUCTIONS
You may use Advil (ibuprofen) 400-600mg every 6 hours or at least twice per day (OR Aleve (naproxen) 250-500mg every 12 hours as needed for pain and inflammation).  However do not mix or take other NSAIDs together such as Advil, Motrin, ibuprofen, Celebrex, naproxen, diclofenac or Aleve.    You may also take Tylenol (acetaminophen) together with Advil (ibuprofen) or Aleve (naproxen) as this is safe and can help decrease your pain levels.  The dosing for Tylenol is 500mg every 4-6 hours as needed OR max 1,000mg per dose up to 3 times per day for a total of 3,000mg per day  *Check with your primary care physician to see if these medications are safe to take and to make sure they do not interfere with your other medications and medical issues.            Patient Education     Rotator Cuff Tear Exercises   About this topic   A rotator cuff tear is a problem that can limit how much you can move your shoulder. It can also be painful. The rotator cuff is a group of muscles and tendons in your shoulder. It helps your shoulder move and be steady. These muscles help to rotate and lift the arm. Tendons are strong bands that connect muscles to bones. You can tear a tendon in your shoulder if you fall or move your shoulder too fast and with too much force. Your tendon can also wear out over time and tear. Large tears may require surgery. For small tears, exercises may help make this problem better.  General   Before starting with a program, ask your doctor if you are healthy enough to do these exercises. Your doctor may have you work with a  or physical therapist to make a safe exercise program to meet your needs. You should not do the exercises if they cause sharp pains. You may need to start out with easy exercises at first. Then, once your shoulder is feeling better, you may start the harder exercises.  Passive Exercises:   You use the movement of your body to move your arm. Do NOT use your shoulder muscles to move  your arm.  Pendulum exercises ? Hold onto a table with one hand and bend forward at the waist until your back is level with the table. Let your sore arm hang in front of you. Do each exercise for 1 minute.  Circles ? Move your body in large circles one way. After you move a few times, your arm should start gently moving in circles. Now, move your body in circles the other way until your arm moves the other way.  Swinging side-to-side ? Move your body side to side. After you move a few times, your arm should start gently moving side-to-side.  Swinging back and forth ? Move your body forwards and then backwards. After you move a few times, your arm should start gently moving back and forth.  Stretching Exercises   Stretching exercises keep your muscles flexible. They also stop them from getting tight. Start by doing each of these stretches 2 to 3 times. In order for your body to make changes, you will need to hold these stretches for 20 to 30 seconds. Try to do the stretches 2 to 3 times each day. Do all exercises slowly.  Strengthening Exercises   Strengthening exercises keep your muscles firm and strong. Start by repeating each exercise 2 to 3 times. Work up to doing each exercise 10 times. Try to do the exercises 2 to 3 times each day. Do all exercises slowly.  Shoulder blade squeezes ? Pinch your shoulder blades together on your upper back and hold 3 to 5 seconds. Relax.  Cane rehab exercises:  Overhead flexions ? Lie down and grab the cane with both hands. Start with your arms straight and the cane resting on your upper legs. Keep your arms straight and lift the cane over your head.  Abductions or side-to-side motion ? Stand and grab the cane with both hands. Turn your thumbs to the left to stretch your left shoulder. Start with your arms straight and the cane resting on your upper legs. Push the cane to the left, raising your left arm. Keep your left elbow straight. Keep pushing until you feel a good stretch.  Switch the position of your hands to point your thumbs to the right and push the cane right to stretch your right shoulder.  External rotations or side-to-side rotations ? Lie down and grab the cane with both hands. Start with your elbows bent and touching your body. Put the tip of the cane in the palm of your right hand to stretch your right shoulder. Grab the cane at the other end with your left hand. Push the cane sideways into your right palm until you feel a stretch in your shoulder. Be sure to keep your right elbow close to your body while you are stretching. Switch hands to stretch the left shoulder.  Internal rotations ? Stand up and grab the cane with both hands behind your back. With your palms facing backwards, slide the cane up your back. Go until you feel a good stretch in front of the shoulder.  Shoulder blade exercises ? Lie on your stomach near the edge of a mat or bed or supported on an exercise ball. Start with your arms hanging down in a relaxed position.  Y position ? Raise your arms up and to the sides so your elbows are near your ears and your arms are straight out diagonally like the letter Y. Lower the arms back to the starting position.  T position ? Raise your arms straight out to the sides, even with your shoulders. Lower the arms back to the starting position.  W position ? Raise your arms up and to the sides with your elbows bent. Your hands should be just above your shoulders and looks like a W from above. Lower the arms back to the starting position.  L position ? Keep your elbows at your sides and raise just your lower arms out to the side to make a letter L and a backwards letter L. Lower the arms back to the starting position.  Shoulder exercises ? Tie a knot in an exercise band. Secure the band in a door by shutting it in around waist level. Wrap the band around one hand for a good . Stand away from the door enough to have slight tension in the band. As the exercises get  "easier, you may need to change to a heavier band. Moving closer to, or farther away from, the point where the band is tied down will decrease or increase the tension in the band.  Internal rotations ? Face sideways with the arm holding the band closest to the door. Bend the elbow to 90 degrees or in an \"L\" position. Keeping your elbow by your side and bent, pull the band across your body. Bring it back to the starting position. Repeat with the other arm.  External rotations ? Face sideways with the arm holding the band farthest from the door. Bend the elbow to 90 degrees or in an \"L\" position. Keeping your elbow by your side and bent, pull the band away from your body. Bring it back to the starting position. Repeat with the other arm.  Abductions ? Face sideways with the arm holding the band farthest from the door. Be sure that your thumb is facing upward. Keep your elbow straight and pull the band out to the side and away from your body. Go up to shoulder level. Bring it back to the starting position. Repeat with the other arm.  Flexions ? Face away from the door. Keeping your elbow straight, pull the band straight out in front of you. Bring it back to the starting position. Repeat with the other arm.               What will the results be?   Less pain  More strength  Able to move your arm more  Easier to do daily activities  Shoulder is more stable  Prevent re-injury  Helpful tips   Stay active and work out to keep your muscles strong and flexible.  Eat a healthy diet to keep your muscles healthy.  Be sure you do not hold your breath when exercising. This can raise your blood pressure. If you tend to hold your breath, try counting out loud when exercising. If any exercise bothers you, stop right away.  Always warm up before stretching. Heated muscles stretch much easier than cool muscles. Stretching cool muscles can lead to injury.  Try swinging your arms at an easy pace for a few minutes to warm up your muscles. " Do this again after exercising.  Never bounce when doing stretches.  After exercising, it is a good idea to use ice. Place an ice pack or a bag of frozen peas wrapped in a towel over the painful part. Never put ice right on the skin. Do not leave the ice on more than 10 to 15 minutes at a time. Ice after activity may help decrease pain and swelling. Never ice before stretching.  Doing exercises before a meal may be a good way to get into a routine.  Exercise may be slightly uncomfortable, but you should not have sharp pains. If you do get sharp pains, stop what you are doing. If the sharp pains continue, call your doctor.  Last Reviewed Date   2020-03-10  Consumer Information Use and Disclaimer   This generalized information is a limited summary of diagnosis, treatment, and/or medication information. It is not meant to be comprehensive and should be used as a tool to help the user understand and/or assess potential diagnostic and treatment options. It does NOT include all information about conditions, treatments, medications, side effects, or risks that may apply to a specific patient. It is not intended to be medical advice or a substitute for the medical advice, diagnosis, or treatment of a health care provider based on the health care provider's examination and assessment of a patient’s specific and unique circumstances. Patients must speak with a health care provider for complete information about their health, medical questions, and treatment options, including any risks or benefits regarding use of medications. This information does not endorse any treatments or medications as safe, effective, or approved for treating a specific patient. [employer] and its affiliates disclaim any warranty or liability relating to this information or the use thereof. The use of this information is governed by the Terms of Use, available at https://www.Estoreifyuwer.com/en/know/clinical-effectiveness-terms   Copyright    Copyright © 2024 [employer] and its affiliates and/or licensors. All rights reserved.

## 2025-01-15 DIAGNOSIS — J45.30 MILD PERSISTENT ASTHMA WITHOUT COMPLICATION: Primary | ICD-10-CM

## 2025-01-16 RX ORDER — LEVALBUTEROL INHALATION SOLUTION 0.31 MG/3ML
1 SOLUTION RESPIRATORY (INHALATION) EVERY 4 HOURS PRN
Qty: 25 ML | Refills: 0 | Status: SHIPPED | OUTPATIENT
Start: 2025-01-16 | End: 2025-01-23

## 2025-01-22 DIAGNOSIS — J45.30 MILD PERSISTENT ASTHMA WITHOUT COMPLICATION: ICD-10-CM

## 2025-01-23 RX ORDER — LEVALBUTEROL INHALATION SOLUTION 0.31 MG/3ML
1 SOLUTION RESPIRATORY (INHALATION) EVERY 4 HOURS PRN
Qty: 75 ML | Refills: 0 | Status: SHIPPED | OUTPATIENT
Start: 2025-01-23

## 2025-02-21 ENCOUNTER — RA CDI HCC (OUTPATIENT)
Dept: OTHER | Facility: HOSPITAL | Age: 70
End: 2025-02-21

## 2025-02-24 ENCOUNTER — APPOINTMENT (OUTPATIENT)
Dept: LAB | Facility: CLINIC | Age: 70
End: 2025-02-24
Payer: COMMERCIAL

## 2025-02-24 ENCOUNTER — RESULTS FOLLOW-UP (OUTPATIENT)
Dept: FAMILY MEDICINE CLINIC | Facility: CLINIC | Age: 70
End: 2025-02-24

## 2025-02-24 DIAGNOSIS — E78.1 HYPERTRIGLYCERIDEMIA: ICD-10-CM

## 2025-02-24 DIAGNOSIS — R73.01 IFG (IMPAIRED FASTING GLUCOSE): ICD-10-CM

## 2025-02-24 LAB
ALBUMIN SERPL BCG-MCNC: 4.2 G/DL (ref 3.5–5)
ALP SERPL-CCNC: 81 U/L (ref 34–104)
ALT SERPL W P-5'-P-CCNC: 24 U/L (ref 7–52)
ANION GAP SERPL CALCULATED.3IONS-SCNC: 8 MMOL/L (ref 4–13)
AST SERPL W P-5'-P-CCNC: 23 U/L (ref 13–39)
BILIRUB SERPL-MCNC: 0.87 MG/DL (ref 0.2–1)
BUN SERPL-MCNC: 11 MG/DL (ref 5–25)
CALCIUM SERPL-MCNC: 9.4 MG/DL (ref 8.4–10.2)
CHLORIDE SERPL-SCNC: 102 MMOL/L (ref 96–108)
CHOLEST SERPL-MCNC: 192 MG/DL (ref ?–200)
CO2 SERPL-SCNC: 30 MMOL/L (ref 21–32)
CREAT SERPL-MCNC: 0.6 MG/DL (ref 0.6–1.3)
EST. AVERAGE GLUCOSE BLD GHB EST-MCNC: 123 MG/DL
GFR SERPL CREATININE-BSD FRML MDRD: 94 ML/MIN/1.73SQ M
GLUCOSE P FAST SERPL-MCNC: 109 MG/DL (ref 65–99)
HBA1C MFR BLD: 5.9 %
HDLC SERPL-MCNC: 48 MG/DL
LDLC SERPL CALC-MCNC: 99 MG/DL (ref 0–100)
NONHDLC SERPL-MCNC: 144 MG/DL
POTASSIUM SERPL-SCNC: 4.1 MMOL/L (ref 3.5–5.3)
PROT SERPL-MCNC: 7.1 G/DL (ref 6.4–8.4)
SODIUM SERPL-SCNC: 140 MMOL/L (ref 135–147)
TRIGL SERPL-MCNC: 223 MG/DL (ref ?–150)

## 2025-02-24 PROCEDURE — 80053 COMPREHEN METABOLIC PANEL: CPT

## 2025-02-24 PROCEDURE — 80061 LIPID PANEL: CPT

## 2025-02-24 PROCEDURE — 36415 COLL VENOUS BLD VENIPUNCTURE: CPT

## 2025-02-24 PROCEDURE — 83036 HEMOGLOBIN GLYCOSYLATED A1C: CPT

## 2025-02-26 DIAGNOSIS — J45.30 MILD PERSISTENT ASTHMA WITHOUT COMPLICATION: ICD-10-CM

## 2025-02-27 ENCOUNTER — HOSPITAL ENCOUNTER (OUTPATIENT)
Dept: CT IMAGING | Facility: HOSPITAL | Age: 70
End: 2025-02-27
Payer: COMMERCIAL

## 2025-02-27 DIAGNOSIS — R91.8 LUNG NODULES: ICD-10-CM

## 2025-02-27 PROCEDURE — 71250 CT THORAX DX C-: CPT

## 2025-02-27 RX ORDER — LEVALBUTEROL INHALATION SOLUTION 0.31 MG/3ML
1 SOLUTION RESPIRATORY (INHALATION) EVERY 4 HOURS PRN
Qty: 270 ML | Refills: 1 | Status: SHIPPED | OUTPATIENT
Start: 2025-02-27

## 2025-03-05 ENCOUNTER — OFFICE VISIT (OUTPATIENT)
Dept: FAMILY MEDICINE CLINIC | Facility: CLINIC | Age: 70
End: 2025-03-05
Payer: COMMERCIAL

## 2025-03-05 VITALS
WEIGHT: 181.6 LBS | DIASTOLIC BLOOD PRESSURE: 78 MMHG | HEART RATE: 78 BPM | SYSTOLIC BLOOD PRESSURE: 138 MMHG | OXYGEN SATURATION: 97 % | HEIGHT: 62 IN | BODY MASS INDEX: 33.42 KG/M2

## 2025-03-05 DIAGNOSIS — R00.2 PALPITATIONS: ICD-10-CM

## 2025-03-05 DIAGNOSIS — E78.1 HYPERTRIGLYCERIDEMIA: ICD-10-CM

## 2025-03-05 DIAGNOSIS — J30.1 SEASONAL ALLERGIC RHINITIS DUE TO POLLEN: ICD-10-CM

## 2025-03-05 DIAGNOSIS — D35.00 ADRENAL ADENOMA, UNSPECIFIED LATERALITY: ICD-10-CM

## 2025-03-05 DIAGNOSIS — M85.89 OSTEOPENIA OF MULTIPLE SITES: ICD-10-CM

## 2025-03-05 DIAGNOSIS — R73.01 IFG (IMPAIRED FASTING GLUCOSE): ICD-10-CM

## 2025-03-05 DIAGNOSIS — J45.30 MILD PERSISTENT ASTHMA WITHOUT COMPLICATION: Primary | ICD-10-CM

## 2025-03-05 PROBLEM — R42 VERTIGO: Status: ACTIVE | Noted: 2025-03-05

## 2025-03-05 PROBLEM — R42 VERTIGO: Status: RESOLVED | Noted: 2025-03-05 | Resolved: 2025-03-05

## 2025-03-05 PROCEDURE — G2211 COMPLEX E/M VISIT ADD ON: HCPCS | Performed by: FAMILY MEDICINE

## 2025-03-05 PROCEDURE — 99214 OFFICE O/P EST MOD 30 MIN: CPT | Performed by: FAMILY MEDICINE

## 2025-03-05 RX ORDER — DEXAMETHASONE 1 MG
TABLET ORAL
Qty: 1 TABLET | Refills: 0 | Status: SHIPPED | OUTPATIENT
Start: 2025-03-05

## 2025-03-05 RX ORDER — FLUOROURACIL 50 MG/G
CREAM TOPICAL 2 TIMES DAILY
COMMUNITY
Start: 2025-02-03

## 2025-03-05 NOTE — ASSESSMENT & PLAN NOTE
advise increase in whole grains - fresh fruits, veggies and whole grain cereals and breads; and less simple sugars, processed foods and white floured products, including decreasing intake if sweetened beverages; also encourage exercise for overall cardiovascular health

## 2025-03-05 NOTE — PATIENT INSTRUCTIONS
Take dexamethasone 1 mg at 11:00-12:00 midnight  And then go for fasting labs the next am    Schedule dexa - it is due -   [phone]    Add more water - aim for 64 oz water/day  Shorrterm followup in 2 mos to see how palpitations are doing    To help lower triglycerides:  advise increase in whole grains - fresh fruits, veggies and whole grain cereals and breads; and less simple sugars, processed foods and white floured products, including decreasing intake if sweetened beverages; also encourage exercise for overall cardiovascular health

## 2025-03-05 NOTE — ASSESSMENT & PLAN NOTE
advise increase in whole grains - fresh fruits, veggies and whole grain cereals and breads; and less simple sugars, processed foods and white floured products, including decreasing intake if sweetened beverages; also encourage exercise for overall cardiovascular health    Pt walks dog and dances and works

## 2025-03-05 NOTE — PROGRESS NOTES
Name: Dayna Su      : 1955      MRN: [id number]  Encounter Provider: Rhiannon Bishop DO  Encounter Date: 3/5/2025   Encounter department: [facility]  :  Assessment & Plan  Mild persistent asthma without complication  Well controlled.  Pft 's done 2024           Seasonal allergic rhinitis due to pollen  Stable.         IFG (impaired fasting glucose)  advise increase in whole grains - fresh fruits, veggies and whole grain cereals and breads; and less simple sugars, processed foods and white floured products, including decreasing intake if sweetened beverages; also encourage exercise for overall cardiovascular health         Osteopenia of multiple sites  Dexa due - rx given    Orders:    DXA bone density spine hip and pelvis; Future    Hypertriglyceridemia  advise increase in whole grains - fresh fruits, veggies and whole grain cereals and breads; and less simple sugars, processed foods and white floured products, including decreasing intake if sweetened beverages; also encourage exercise for overall cardiovascular health    Pt walks dog and dances and works       Adrenal adenoma, unspecified laterality  Begin workup with dexamethasone suppression test  1 mg at midnight  And fasting labs 0800 next am  Including DHEAS and cortisol  Pending results - may need urinary catecholamines/endo consult.    Orders:    DHEA-sulfate; Future    dexamethasone (DECADRON) 1 mg tablet; Take 1 tab po between 11:00 and midnight; get fasting bloodwork at 0800 next day    Cortisol Level, AM Specimen; Future    Palpitations  Recommendation to begin drinking more water  Pt does not drink al lot of caffeine  Encourage adequate sleep.  But will also begin workup for adrenal adenoma to r/o functioning adenoma  As it measures over 1 cm  (Stable in size over time)    Return shorrterm to review above  And if not improving/worsening - will need holter             To consider echo at some point due to  "mention on CT chest of annual calcifications  But currently bp controlled    Patient Instructions   Take dexamethasone 1 mg at 11:00-12:00 midnight  And then go for fasting labs the next am    Schedule dexa - it is due -       Add more water - aim for 64 oz water/day  Shorrterm followup in 2 mos to see how palpitations are doing    Return 2 mos/sooner prn         History of Present Illness   HPI  Here with  for PE  Continues with vertigo  Is now in PT for neck - thinking that the neck pain and underlying OA (on xray after last visit) - is contributing to vertigo.    Went to neuro ophthal as well  Has had thorough workup.      Review of Systems   Constitutional:  Negative for chills, diaphoresis, fatigue, fever and unexpected weight change.   Eyes:  Negative for visual disturbance.   Respiratory:  Negative for cough, shortness of breath and wheezing.    Cardiovascular:  Negative for chest pain and palpitations.        Gets occasional palpitations - lasting a couple of min  About 4x/week  But not as often as prior.         Objective   /78   Pulse 78   Ht 5' 2\" (1.575 m)   Wt 82.4 kg (181 lb 9.6 oz)   LMP  (LMP Unknown)   SpO2 97%   BMI 33.22 kg/m²      Physical Exam  Vitals and nursing note reviewed.   Constitutional:       General: She is not in acute distress.     Appearance: Normal appearance. She is not ill-appearing or toxic-appearing.   Neck:      Comments: No thyromegaly    Cardiovascular:      Rate and Rhythm: Normal rate and regular rhythm.      Pulses: Normal pulses.      Heart sounds: Normal heart sounds. No murmur heard.  Pulmonary:      Effort: Pulmonary effort is normal. No respiratory distress.      Breath sounds: Normal breath sounds. No wheezing, rhonchi or rales.   Musculoskeletal:      Cervical back: Neck supple. No tenderness.      Right lower leg: No edema.      Left lower leg: No edema.   Lymphadenopathy:      Cervical: No cervical adenopathy.   Skin:     General: " Skin is warm.      Coloration: Skin is not jaundiced.   Neurological:      General: No focal deficit present.      Mental Status: She is alert and oriented to person, place, and time.   Psychiatric:         Mood and Affect: Mood normal.         Behavior: Behavior normal.         Thought Content: Thought content normal.         Judgment: Judgment normal.

## 2025-04-16 ENCOUNTER — TELEPHONE (OUTPATIENT)
Dept: FAMILY MEDICINE CLINIC | Facility: CLINIC | Age: 70
End: 2025-04-16

## 2025-04-16 NOTE — TELEPHONE ENCOUNTER
LVM for pt to call back to reschedule her May 6, 2025 appointment at 11:00 AM with Dr. Bishop due to her not being in the office that day.

## 2025-04-23 ENCOUNTER — HOSPITAL ENCOUNTER (OUTPATIENT)
Dept: BONE DENSITY | Facility: MEDICAL CENTER | Age: 70
Discharge: HOME | End: 2025-04-23
Payer: COMMERCIAL

## 2025-04-23 VITALS — WEIGHT: 181 LBS | HEIGHT: 62 IN | BODY MASS INDEX: 33.31 KG/M2

## 2025-04-23 DIAGNOSIS — M85.89 OSTEOPENIA OF MULTIPLE SITES: ICD-10-CM

## 2025-04-23 PROCEDURE — 77080 DXA BONE DENSITY AXIAL: CPT

## 2025-04-30 ENCOUNTER — TELEPHONE (OUTPATIENT)
Age: 70
End: 2025-04-30

## 2025-04-30 NOTE — TELEPHONE ENCOUNTER
Patient stated that on good Friday she had the labs and dexascan completed.  She would like her results.  The labs were taken at [facility] [address]  [facility] since we were closed.  Pls call and advise .      Thank you

## 2025-05-02 ENCOUNTER — RESULTS FOLLOW-UP (OUTPATIENT)
Dept: FAMILY MEDICINE CLINIC | Facility: CLINIC | Age: 70
End: 2025-05-02

## 2025-05-21 ENCOUNTER — RESULTS FOLLOW-UP (OUTPATIENT)
Dept: FAMILY MEDICINE CLINIC | Facility: CLINIC | Age: 70
End: 2025-05-21

## 2025-05-21 NOTE — RESULT ENCOUNTER NOTE
Good morning  Your labs do not show anything significant  Will continue to monitor for any signs of a hyperfunctioning adrenal adenoma on clinical exam  Please schedule your annual medicare visit with me which is due in July.  Have a great day!  Dr stubbs

## 2025-07-07 ENCOUNTER — OFFICE VISIT (OUTPATIENT)
Dept: GASTROENTEROLOGY | Facility: MEDICAL CENTER | Age: 70
End: 2025-07-07
Payer: COMMERCIAL

## 2025-07-07 ENCOUNTER — TELEPHONE (OUTPATIENT)
Dept: GASTROENTEROLOGY | Facility: MEDICAL CENTER | Age: 70
End: 2025-07-07

## 2025-07-07 VITALS
OXYGEN SATURATION: 98 % | SYSTOLIC BLOOD PRESSURE: 146 MMHG | HEART RATE: 67 BPM | BODY MASS INDEX: 33.31 KG/M2 | WEIGHT: 181 LBS | HEIGHT: 62 IN | TEMPERATURE: 97.8 F | DIASTOLIC BLOOD PRESSURE: 84 MMHG

## 2025-07-07 DIAGNOSIS — Z12.11 SCREENING FOR COLON CANCER: ICD-10-CM

## 2025-07-07 DIAGNOSIS — K21.9 GASTROESOPHAGEAL REFLUX DISEASE WITHOUT ESOPHAGITIS: Primary | ICD-10-CM

## 2025-07-07 DIAGNOSIS — K63.5 POLYP OF COLON, UNSPECIFIED PART OF COLON, UNSPECIFIED TYPE: Primary | ICD-10-CM

## 2025-07-07 DIAGNOSIS — R13.19 ESOPHAGEAL DYSPHAGIA: ICD-10-CM

## 2025-07-07 DIAGNOSIS — Z86.0100 PERSONAL HISTORY OF COLON POLYPS, UNSPECIFIED: ICD-10-CM

## 2025-07-07 PROCEDURE — 99214 OFFICE O/P EST MOD 30 MIN: CPT | Performed by: NURSE PRACTITIONER

## 2025-07-07 RX ORDER — POLYETHYLENE GLYCOL 3350, SODIUM SULFATE ANHYDROUS, SODIUM BICARBONATE, SODIUM CHLORIDE, POTASSIUM CHLORIDE 236; 22.74; 6.74; 5.86; 2.97 G/4L; G/4L; G/4L; G/4L; G/4L
POWDER, FOR SOLUTION ORAL
Refills: 0 | OUTPATIENT
Start: 2025-07-07

## 2025-07-07 RX ORDER — SODIUM CHLORIDE, SODIUM LACTATE, POTASSIUM CHLORIDE, CALCIUM CHLORIDE 600; 310; 30; 20 MG/100ML; MG/100ML; MG/100ML; MG/100ML
125 INJECTION, SOLUTION INTRAVENOUS CONTINUOUS
OUTPATIENT
Start: 2025-07-07

## 2025-07-07 RX ORDER — BISACODYL 5 MG/1
TABLET, DELAYED RELEASE ORAL
Qty: 4 TABLET | Refills: 0 | Status: SHIPPED | OUTPATIENT
Start: 2025-07-07

## 2025-07-07 RX ORDER — SODIUM PICOSULFATE, MAGNESIUM OXIDE, AND ANHYDROUS CITRIC ACID 12; 3.5; 1 G/175ML; G/175ML; MG/175ML
LIQUID ORAL
Qty: 350 ML | Refills: 0 | Status: SHIPPED | OUTPATIENT
Start: 2025-07-07

## 2025-07-07 RX ORDER — POLYETHYLENE GLYCOL 3350, SODIUM SULFATE ANHYDROUS, SODIUM BICARBONATE, SODIUM CHLORIDE, POTASSIUM CHLORIDE 236; 22.74; 6.74; 5.86; 2.97 G/4L; G/4L; G/4L; G/4L; G/4L
4000 POWDER, FOR SOLUTION ORAL ONCE
Qty: 4000 ML | Refills: 0 | Status: SHIPPED | OUTPATIENT
Start: 2025-07-07 | End: 2025-07-07 | Stop reason: CLARIF

## 2025-07-07 NOTE — PROGRESS NOTES
Name: Dayna Su      : 1955      MRN: [id number]  Encounter Provider: CAROLYN Barber  Encounter Date: 2025   Encounter department: [facility]  :  Assessment & Plan  Gastroesophageal reflux disease without esophagitis  Esophageal dysphagia   Currently on famotidine 40 mg daily which helps her reflux.  She is reporting intermittent bouts of upper esophageal dysphagia with solids.  It advances with liquids over time.  Last EGD was  which noted a nodular mucosa in the GE junction showing mild inflammation but no evidence of Cody's esophagus.  Repeat EGD recommended in 3 years.  Will rule out PUD, gastritis/esophagitis, EOE.      Orders:    EGD; Future  Continue famotidine 40 mg daily  Follow-up in office after testing  Screening for colon cancer  Personal history of colon polyps, unspecified  Personal history of colon polyps.  BMs are brown and formed daily to every other day.  Denies any melena or hematochezia.  Last colonoscopy was  which noted no polyps but was fair prep.  Recall colonoscopy recommended in 5 years.    Orders:    Colonoscopy; Future    sodium picosulfate, magnesium oxide, citric acid (Clenpiq) oral solution; Take 175 mL (1 bottle) the evening before the colonoscopy, between 5 PM and 9 PM, followed by a second 175 mL bottle 5 hours before the colonoscopy.    I reviewed with patient/family potential risks of endoscopic evaluation including possible infection, bleeding or perforation.  Patient/family verbalized understanding of potential risks and agreed to procedure(s).    History of Present Illness   Dayna Su is a 70 y.o. female who presents for follow-up.  She was last seen by Lu Choi PA-C 10/24 for GERD, dysphagia and colon cancer screening.      HPI    History of colon polyps several years ago.  Last colon  was a fair prep but no polyps.  Repeat colonoscopy recommended in 5 years.    Longstanding history  of GERD.  Currently on famotidine 40 mg daily which helps her reflux.  She is reporting intermittent bouts of upper esophageal dysphagia with solids.  It advances with liquids over time.  Last EGD was 2022 which noted a nodular mucosa in the GE junction showing mild inflammation but no evidence of Cody's esophagus.  Repeat EGD recommended in 3 years.      No recent abdominal imaging to review.  Labs 2/25 to CMP normal other than fasting glucose 109, hemoglobin A1c 5.9.    Prior EGD/colonoscopy    Colon 8/20-no polyps.  Repeat colonoscopy in 5 years due to fair prep. Hx of colon polyps in past.    EGD 2020-multilayered epithelium thought to be a precursor to Cody's.    EGD 2022-nodular mucosa of the GE junction showing mild chronic inflammation.  No evidence of Cody's esophagus.  Repeat EGD with next colonoscopy 8/25.      Review of Systems   Constitutional:  Negative for chills and fever.   HENT:  Positive for trouble swallowing. Negative for ear pain and sore throat.    Eyes:  Negative for pain and visual disturbance.   Respiratory:  Negative for cough and shortness of breath.    Cardiovascular:  Negative for chest pain and palpitations.   Gastrointestinal:  Negative for abdominal pain and vomiting.   Genitourinary:  Negative for dysuria and hematuria.   Musculoskeletal:  Negative for arthralgias and back pain.   Skin:  Negative for color change and rash.   Neurological:  Negative for seizures and syncope.   All other systems reviewed and are negative.   A complete review of systems is negative other than that noted above in the HPI.      Current Medications[1]  Objective   LMP  (LMP Unknown)     Physical Exam  Vitals and nursing note reviewed.   Constitutional:       General: She is not in acute distress.     Appearance: She is well-developed.   HENT:      Head: Normocephalic and atraumatic.     Eyes:      Conjunctiva/sclera: Conjunctivae normal.       Cardiovascular:      Rate and Rhythm: Normal rate and  regular rhythm.      Heart sounds: No murmur heard.  Pulmonary:      Effort: Pulmonary effort is normal. No respiratory distress.      Breath sounds: Normal breath sounds.   Abdominal:      General: Bowel sounds are normal.      Palpations: Abdomen is soft.      Tenderness: There is no abdominal tenderness.     Musculoskeletal:         General: No swelling.      Cervical back: Neck supple.     Skin:     General: Skin is warm and dry.      Capillary Refill: Capillary refill takes less than 2 seconds.     Neurological:      Mental Status: She is alert and oriented to person, place, and time.     Psychiatric:         Mood and Affect: Mood normal.            Lab Results: I personally reviewed relevant lab results.       Results for orders placed during the hospital encounter of 10/25/22    EGD    Impression  Normal esophagus, biopsied to rule out EOE  Mild nodularity at GE junction, biopsied  Multiple gastric polyps consistent with fundic gland polyps  Erythematous and mildly ulcerated mucosa at antrum, biopsied to rule out H pylori  Normal duodenum    RECOMMENDATION:  Await pathology results  Continue Nexium  Follow up in GI office        Janey Lino DO               [1]   Current Outpatient Medications   Medication Sig Dispense Refill    albuterol (PROVENTIL HFA,VENTOLIN HFA) 90 mcg/act inhaler INHALE 2 PUFFS EVERY 6 HOURS AS NEEDED FOR WHEEZING      cannabidiol (EPIDIOLEX) 100 mg/ml SOLN Pt vapes at night      Cholecalciferol 50 MCG (2000 UT) CAPS Take by mouth      dexamethasone (DECADRON) 1 mg tablet Take 1 tab po between 11:00 and midnight; get fasting bloodwork at 0800 next day 1 tablet 0    diphenhydrAMINE (BENADRYL) 25 mg tablet Take 25 mg by mouth every 6 (six) hours as needed for itching      famotidine (PEPCID) 40 MG tablet Take 1 tablet (40 mg total) by mouth daily 90 tablet 2    fluorouracil (EFUDEX) 5 % cream Apply topically 2 (two) times a day      fluticasone-salmeterol (ADVAIR) 100-50 mcg/dose  Inhale 1 puff every 12 (twelve) hours      levalbuterol (XOPENEX) 0.31 mg/3 mL nebulizer solution TAKE 3 ML (0.31 MG TOTAL) BY NEBULIZATION EVERY 4 (FOUR) HOURS AS NEEDED FOR WHEEZING 270 mL 1    metaxalone (SKELAXIN) 800 mg tablet Take 800 mg by mouth Three times daily as needed       No current facility-administered medications for this visit.

## 2025-07-15 ENCOUNTER — ANESTHESIA (OUTPATIENT)
Dept: ANESTHESIOLOGY | Facility: HOSPITAL | Age: 70
End: 2025-07-15

## 2025-07-15 ENCOUNTER — ANESTHESIA EVENT (OUTPATIENT)
Dept: ANESTHESIOLOGY | Facility: HOSPITAL | Age: 70
End: 2025-07-15

## 2025-07-16 ENCOUNTER — TELEPHONE (OUTPATIENT)
Age: 70
End: 2025-07-16

## 2025-07-16 NOTE — TELEPHONE ENCOUNTER
Patients  contacted office stating Babs was called into pharmacy. Confirmed with documentation that Clenpiq is not covered by insurance. Golytely prep directions sent via [employer].

## 2025-07-17 ENCOUNTER — TELEPHONE (OUTPATIENT)
Dept: GASTROENTEROLOGY | Facility: MEDICAL CENTER | Age: 70
End: 2025-07-17

## 2025-07-17 NOTE — TELEPHONE ENCOUNTER
Sent pt MYC message to confirm upcoming colon/EGD scheduled for 7/29/25 with  at [facility].

## 2025-07-21 NOTE — TELEPHONE ENCOUNTER
Patients GI provider:  Dr. Shepard    Number to return call: ([phone]    Reason for call: Pt calling because she picked up the Golytely but says she will not be able to complete it; She says she tried to drink this for her last scopy and wasn't able to get through it then, so is sure there is no way she will be able to now. I let her know I would reach out to the providers team and see what they suggest. Pt would like to speak with someone once this is decided, Please advise?     Scheduled procedure/appointment date if applicable: 07.29.25

## 2025-07-21 NOTE — TELEPHONE ENCOUNTER
LVM to confirm upcoming colon/EGD for 7/29 with  at [facility], requested call back if patient has questions.

## 2025-07-22 DIAGNOSIS — D12.6 ADENOMATOUS POLYP OF COLON, UNSPECIFIED PART OF COLON: Primary | ICD-10-CM

## 2025-07-22 RX ORDER — BISACODYL 5 MG/1
TABLET, DELAYED RELEASE ORAL
Qty: 4 TABLET | Refills: 0 | Status: SHIPPED | OUTPATIENT
Start: 2025-07-22

## 2025-07-22 RX ORDER — POLYETHYLENE GLYCOL 3350 17 G/17G
POWDER, FOR SOLUTION ORAL
Qty: 238 G | Refills: 0 | Status: SHIPPED | OUTPATIENT
Start: 2025-07-22

## 2025-07-28 ENCOUNTER — ANESTHESIA EVENT (OUTPATIENT)
Dept: GASTROENTEROLOGY | Facility: MEDICAL CENTER | Age: 70
End: 2025-07-28
Payer: COMMERCIAL

## 2025-07-28 NOTE — TELEPHONE ENCOUNTER
Pt called to verify her prep instructions, went over je / dul. She was sent Golytely as well so she was confused

## 2025-07-29 ENCOUNTER — ANESTHESIA (OUTPATIENT)
Dept: GASTROENTEROLOGY | Facility: MEDICAL CENTER | Age: 70
End: 2025-07-29
Payer: COMMERCIAL

## 2025-07-29 ENCOUNTER — HOSPITAL ENCOUNTER (OUTPATIENT)
Dept: GASTROENTEROLOGY | Facility: MEDICAL CENTER | Age: 70
Setting detail: OUTPATIENT SURGERY
Discharge: HOME | End: 2025-07-29
Attending: NURSE PRACTITIONER
Payer: COMMERCIAL

## 2025-07-29 VITALS
HEIGHT: 62 IN | HEART RATE: 79 BPM | BODY MASS INDEX: 33.13 KG/M2 | OXYGEN SATURATION: 95 % | WEIGHT: 180 LBS | TEMPERATURE: 97.3 F | SYSTOLIC BLOOD PRESSURE: 122 MMHG | RESPIRATION RATE: 20 BRPM | DIASTOLIC BLOOD PRESSURE: 77 MMHG

## 2025-07-29 DIAGNOSIS — Z12.11 SCREENING FOR COLON CANCER: ICD-10-CM

## 2025-07-29 DIAGNOSIS — R13.19 ESOPHAGEAL DYSPHAGIA: ICD-10-CM

## 2025-07-29 DIAGNOSIS — K21.9 GASTROESOPHAGEAL REFLUX DISEASE WITHOUT ESOPHAGITIS: ICD-10-CM

## 2025-07-29 PROCEDURE — 88305 TISSUE EXAM BY PATHOLOGIST: CPT | Performed by: PATHOLOGY

## 2025-07-29 RX ORDER — PROMETHAZINE HYDROCHLORIDE 25 MG/ML
12.5 INJECTION, SOLUTION INTRAMUSCULAR; INTRAVENOUS ONCE AS NEEDED
Status: CANCELLED | OUTPATIENT
Start: 2025-07-29

## 2025-07-29 RX ORDER — HYDROMORPHONE HCL/PF 1 MG/ML
0.5 SYRINGE (ML) INJECTION
Refills: 0 | Status: CANCELLED | OUTPATIENT
Start: 2025-07-29

## 2025-07-29 RX ORDER — PANTOPRAZOLE SODIUM 40 MG/1
40 TABLET, DELAYED RELEASE ORAL
Qty: 30 TABLET | Refills: 2 | Status: SHIPPED | OUTPATIENT
Start: 2025-07-29 | End: 2025-10-27

## 2025-07-29 RX ORDER — PROPOFOL 10 MG/ML
INJECTION, EMULSION INTRAVENOUS AS NEEDED
Status: DISCONTINUED | OUTPATIENT
Start: 2025-07-29 | End: 2025-07-29

## 2025-07-29 RX ORDER — ONDANSETRON 2 MG/ML
4 INJECTION INTRAMUSCULAR; INTRAVENOUS ONCE AS NEEDED
Status: CANCELLED | OUTPATIENT
Start: 2025-07-29

## 2025-07-29 RX ORDER — LIDOCAINE HYDROCHLORIDE 10 MG/ML
0.5 INJECTION, SOLUTION EPIDURAL; INFILTRATION; INTRACAUDAL; PERINEURAL ONCE AS NEEDED
Status: DISCONTINUED | OUTPATIENT
Start: 2025-07-29 | End: 2025-08-02 | Stop reason: HOSPADM

## 2025-07-29 RX ORDER — LIDOCAINE HYDROCHLORIDE 10 MG/ML
INJECTION, SOLUTION EPIDURAL; INFILTRATION; INTRACAUDAL; PERINEURAL AS NEEDED
Status: DISCONTINUED | OUTPATIENT
Start: 2025-07-29 | End: 2025-07-29

## 2025-07-29 RX ORDER — ALBUTEROL SULFATE 0.83 MG/ML
2.5 SOLUTION RESPIRATORY (INHALATION) ONCE AS NEEDED
Status: CANCELLED | OUTPATIENT
Start: 2025-07-29

## 2025-07-29 RX ORDER — FENTANYL CITRATE/PF 50 MCG/ML
25 SYRINGE (ML) INJECTION
Refills: 0 | Status: CANCELLED | OUTPATIENT
Start: 2025-07-29

## 2025-07-29 RX ORDER — LABETALOL HYDROCHLORIDE 5 MG/ML
5 INJECTION, SOLUTION INTRAVENOUS
Status: CANCELLED | OUTPATIENT
Start: 2025-07-29

## 2025-07-29 RX ORDER — SODIUM CHLORIDE, SODIUM LACTATE, POTASSIUM CHLORIDE, CALCIUM CHLORIDE 600; 310; 30; 20 MG/100ML; MG/100ML; MG/100ML; MG/100ML
125 INJECTION, SOLUTION INTRAVENOUS CONTINUOUS
Status: DISCONTINUED | OUTPATIENT
Start: 2025-07-29 | End: 2025-08-02 | Stop reason: HOSPADM

## 2025-07-29 RX ADMIN — PROPOFOL 120 MCG/KG/MIN: 10 INJECTION, EMULSION INTRAVENOUS at 09:56

## 2025-07-29 RX ADMIN — LIDOCAINE HYDROCHLORIDE 50 MG: 10 INJECTION, SOLUTION EPIDURAL; INFILTRATION; INTRACAUDAL at 09:39

## 2025-07-29 RX ADMIN — PROPOFOL 50 MG: 10 INJECTION, EMULSION INTRAVENOUS at 09:55

## 2025-07-29 RX ADMIN — SODIUM CHLORIDE, SODIUM LACTATE, POTASSIUM CHLORIDE, AND CALCIUM CHLORIDE: .6; .31; .03; .02 INJECTION, SOLUTION INTRAVENOUS at 09:37

## 2025-07-29 RX ADMIN — Medication 40 MG: at 10:03

## 2025-07-29 RX ADMIN — PROPOFOL 50 MG: 10 INJECTION, EMULSION INTRAVENOUS at 10:10

## 2025-07-29 RX ADMIN — PROPOFOL 50 MG: 10 INJECTION, EMULSION INTRAVENOUS at 10:23

## 2025-07-29 RX ADMIN — PROPOFOL 50 MG: 10 INJECTION, EMULSION INTRAVENOUS at 09:51

## 2025-07-29 RX ADMIN — PROPOFOL 50 MG: 10 INJECTION, EMULSION INTRAVENOUS at 09:46

## 2025-07-29 RX ADMIN — PROPOFOL 50 MG: 10 INJECTION, EMULSION INTRAVENOUS at 09:53

## 2025-07-29 RX ADMIN — PROPOFOL 50 MG: 10 INJECTION, EMULSION INTRAVENOUS at 09:48

## 2025-07-29 RX ADMIN — PROPOFOL 150 MG: 10 INJECTION, EMULSION INTRAVENOUS at 09:39

## 2025-07-29 RX ADMIN — SODIUM CHLORIDE, SODIUM LACTATE, POTASSIUM CHLORIDE, AND CALCIUM CHLORIDE 125 ML/HR: .6; .31; .03; .02 INJECTION, SOLUTION INTRAVENOUS at 09:21

## 2025-07-29 RX ADMIN — PROPOFOL 50 MG: 10 INJECTION, EMULSION INTRAVENOUS at 09:42

## 2025-07-29 RX ADMIN — PROPOFOL 50 MG: 10 INJECTION, EMULSION INTRAVENOUS at 09:44

## 2025-08-04 PROCEDURE — 88305 TISSUE EXAM BY PATHOLOGIST: CPT | Performed by: PATHOLOGY

## 2025-08-07 ENCOUNTER — TELEPHONE (OUTPATIENT)
Dept: GASTROENTEROLOGY | Facility: MEDICAL CENTER | Age: 70
End: 2025-08-07

## (undated) DEVICE — SINGLE-USE BIOPSY FORCEPS: Brand: RADIAL JAW 4

## (undated) DEVICE — SINGLE-USE POLYPECTOMY SNARE: Brand: ROTATABLE SNARE

## (undated) DEVICE — FORCEP ELECSURG RADIAL JAW4 2.2 X 240CM  HOT BX